# Patient Record
Sex: MALE | Race: WHITE | NOT HISPANIC OR LATINO | Employment: OTHER | URBAN - NONMETROPOLITAN AREA
[De-identification: names, ages, dates, MRNs, and addresses within clinical notes are randomized per-mention and may not be internally consistent; named-entity substitution may affect disease eponyms.]

---

## 2023-05-16 ENCOUNTER — OFFICE VISIT (OUTPATIENT)
Dept: PRIMARY CARE | Facility: CLINIC | Age: 78
End: 2023-05-16
Payer: MEDICARE

## 2023-05-16 VITALS
SYSTOLIC BLOOD PRESSURE: 148 MMHG | DIASTOLIC BLOOD PRESSURE: 82 MMHG | BODY MASS INDEX: 25.55 KG/M2 | HEART RATE: 62 BPM | TEMPERATURE: 96.9 F | WEIGHT: 192.8 LBS | OXYGEN SATURATION: 97 % | HEIGHT: 73 IN

## 2023-05-16 DIAGNOSIS — Z13.1 SCREENING FOR DIABETES MELLITUS: ICD-10-CM

## 2023-05-16 DIAGNOSIS — R73.09 ELEVATED GLUCOSE: ICD-10-CM

## 2023-05-16 DIAGNOSIS — Z00.00 ROUTINE GENERAL MEDICAL EXAMINATION AT HEALTH CARE FACILITY: Primary | ICD-10-CM

## 2023-05-16 DIAGNOSIS — Z13.89 SCREENING FOR MULTIPLE CONDITIONS: ICD-10-CM

## 2023-05-16 DIAGNOSIS — I10 PRIMARY HYPERTENSION: ICD-10-CM

## 2023-05-16 DIAGNOSIS — E55.9 VITAMIN D DEFICIENCY: ICD-10-CM

## 2023-05-16 DIAGNOSIS — R53.83 OTHER FATIGUE: ICD-10-CM

## 2023-05-16 PROBLEM — Z86.0101 HISTORY OF ADENOMATOUS POLYP OF COLON: Status: ACTIVE | Noted: 2023-05-16

## 2023-05-16 PROBLEM — M25.612 STIFFNESS OF LEFT SHOULDER JOINT: Status: ACTIVE | Noted: 2023-05-16

## 2023-05-16 PROBLEM — E87.6 HYPOKALEMIA: Status: ACTIVE | Noted: 2023-05-16

## 2023-05-16 PROBLEM — M25.512 LEFT SHOULDER PAIN: Status: ACTIVE | Noted: 2023-05-16

## 2023-05-16 PROBLEM — Z86.010 HISTORY OF ADENOMATOUS POLYP OF COLON: Status: ACTIVE | Noted: 2023-05-16

## 2023-05-16 PROBLEM — R79.89 LOW VITAMIN D LEVEL: Status: ACTIVE | Noted: 2023-05-16

## 2023-05-16 PROBLEM — K21.9 GERD (GASTROESOPHAGEAL REFLUX DISEASE): Status: ACTIVE | Noted: 2023-05-16

## 2023-05-16 PROBLEM — K58.2 IRRITABLE BOWEL SYNDROME WITH BOTH CONSTIPATION AND DIARRHEA: Status: ACTIVE | Noted: 2023-05-16

## 2023-05-16 PROBLEM — K40.90 INGUINAL HERNIA: Status: ACTIVE | Noted: 2023-05-16

## 2023-05-16 PROBLEM — F40.02 AGORAPHOBIA WITHOUT PANIC: Status: ACTIVE | Noted: 2023-05-16

## 2023-05-16 PROBLEM — H25.813 COMBINED FORM OF AGE-RELATED CATARACT, BOTH EYES: Status: ACTIVE | Noted: 2023-05-16

## 2023-05-16 LAB
ALANINE AMINOTRANSFERASE (SGPT) (U/L) IN SER/PLAS: 24 U/L (ref 10–52)
ALBUMIN (G/DL) IN SER/PLAS: 4.3 G/DL (ref 3.4–5)
ALKALINE PHOSPHATASE (U/L) IN SER/PLAS: 78 U/L (ref 33–136)
ANION GAP IN SER/PLAS: 14 MMOL/L (ref 10–20)
ASPARTATE AMINOTRANSFERASE (SGOT) (U/L) IN SER/PLAS: 22 U/L (ref 9–39)
BILIRUBIN TOTAL (MG/DL) IN SER/PLAS: 0.7 MG/DL (ref 0–1.2)
CALCIUM (MG/DL) IN SER/PLAS: 9.5 MG/DL (ref 8.6–10.3)
CARBON DIOXIDE, TOTAL (MMOL/L) IN SER/PLAS: 27 MMOL/L (ref 21–32)
CHLORIDE (MMOL/L) IN SER/PLAS: 104 MMOL/L (ref 98–107)
CHOLESTEROL (MG/DL) IN SER/PLAS: 172 MG/DL (ref 0–199)
CHOLESTEROL IN HDL (MG/DL) IN SER/PLAS: 43 MG/DL
CHOLESTEROL/HDL RATIO: 4
CREATININE (MG/DL) IN SER/PLAS: 0.99 MG/DL (ref 0.5–1.3)
GFR MALE: 77 ML/MIN/1.73M2
GLUCOSE (MG/DL) IN SER/PLAS: 120 MG/DL (ref 74–99)
LDL: 94 MG/DL (ref 0–99)
POTASSIUM (MMOL/L) IN SER/PLAS: 4 MMOL/L (ref 3.5–5.3)
PROTEIN TOTAL: 7.4 G/DL (ref 6.4–8.2)
SODIUM (MMOL/L) IN SER/PLAS: 141 MMOL/L (ref 136–145)
TRIGLYCERIDE (MG/DL) IN SER/PLAS: 177 MG/DL (ref 0–149)
UREA NITROGEN (MG/DL) IN SER/PLAS: 15 MG/DL (ref 6–23)
VLDL: 35 MG/DL (ref 0–40)

## 2023-05-16 PROCEDURE — G0439 PPPS, SUBSEQ VISIT: HCPCS

## 2023-05-16 PROCEDURE — 3077F SYST BP >= 140 MM HG: CPT

## 2023-05-16 PROCEDURE — 1170F FXNL STATUS ASSESSED: CPT

## 2023-05-16 PROCEDURE — 80053 COMPREHEN METABOLIC PANEL: CPT

## 2023-05-16 PROCEDURE — 3079F DIAST BP 80-89 MM HG: CPT

## 2023-05-16 PROCEDURE — 83036 HEMOGLOBIN GLYCOSYLATED A1C: CPT

## 2023-05-16 PROCEDURE — 84443 ASSAY THYROID STIM HORMONE: CPT

## 2023-05-16 PROCEDURE — 1036F TOBACCO NON-USER: CPT

## 2023-05-16 PROCEDURE — 36415 COLL VENOUS BLD VENIPUNCTURE: CPT

## 2023-05-16 PROCEDURE — 1159F MED LIST DOCD IN RCRD: CPT

## 2023-05-16 PROCEDURE — 80061 LIPID PANEL: CPT

## 2023-05-16 PROCEDURE — 1160F RVW MEDS BY RX/DR IN RCRD: CPT

## 2023-05-16 RX ORDER — AMLODIPINE BESYLATE 10 MG/1
10 TABLET ORAL DAILY
Qty: 90 TABLET | Refills: 1 | Status: SHIPPED | OUTPATIENT
Start: 2023-05-16 | End: 2023-11-15 | Stop reason: SDUPTHER

## 2023-05-16 RX ORDER — VIT C/E/ZN/COPPR/LUTEIN/ZEAXAN 250MG-90MG
25 CAPSULE ORAL DAILY
Qty: 90 CAPSULE | Refills: 2 | Status: SHIPPED | OUTPATIENT
Start: 2023-05-16 | End: 2024-05-15

## 2023-05-16 RX ORDER — AMLODIPINE BESYLATE 10 MG/1
10 TABLET ORAL DAILY
COMMUNITY
End: 2023-05-16 | Stop reason: SDUPTHER

## 2023-05-16 ASSESSMENT — PATIENT HEALTH QUESTIONNAIRE - PHQ9
1. LITTLE INTEREST OR PLEASURE IN DOING THINGS: NOT AT ALL
2. FEELING DOWN, DEPRESSED OR HOPELESS: NOT AT ALL
SUM OF ALL RESPONSES TO PHQ9 QUESTIONS 1 AND 2: 0

## 2023-05-16 ASSESSMENT — ENCOUNTER SYMPTOMS
LOSS OF SENSATION IN FEET: 0
HEADACHES: 0
RESPIRATORY NEGATIVE: 1
SHORTNESS OF BREATH: 0
OCCASIONAL FEELINGS OF UNSTEADINESS: 0
ALLERGIC/IMMUNOLOGIC NEGATIVE: 1
ENDOCRINE NEGATIVE: 1
UNEXPECTED WEIGHT CHANGE: 0
DIZZINESS: 0
ABDOMINAL PAIN: 0
FEVER: 0
PSYCHIATRIC NEGATIVE: 1
MUSCULOSKELETAL NEGATIVE: 1
CARDIOVASCULAR NEGATIVE: 1
DEPRESSION: 0
ACTIVITY CHANGE: 0
GASTROINTESTINAL NEGATIVE: 1
WEAKNESS: 0
FATIGUE: 0

## 2023-05-16 ASSESSMENT — ACTIVITIES OF DAILY LIVING (ADL)
DOING_HOUSEWORK: INDEPENDENT
DRESSING: INDEPENDENT
TAKING_MEDICATION: INDEPENDENT
MANAGING_FINANCES: INDEPENDENT
GROCERY_SHOPPING: INDEPENDENT
BATHING: INDEPENDENT

## 2023-05-16 NOTE — PROGRESS NOTES
Subjective   Reason for Visit: Arie Agustin is an 78 y.o. male here for a Medicare Wellness visit.     Past Medical, Surgical, and Family History reviewed and updated in chart.    Reviewed all medications by prescribing practitioner or clinical pharmacist (such as prescriptions, OTCs, herbal therapies and supplements) and documented in the medical record.    Fatigued more oftenSubjective  Ruperto Agustin is a 78 y.o. male who presents for medicare wellness, and evaluation of fatigue. Symptoms began several years ago. The patient feels the fatigue began with: constipation. Symptoms of his fatigue have been hypersomnolence and lack of interest in usual activities. Patient describes the following psychological symptoms: none. Patient denies change in hair texture, cold intolerance, fever, symptoms of arthritis, and unusual rashes. Symptoms have waxed and waned, worse overall. Symptom severity: symptoms bothersome, trouble unable to carry out all usual work/school/family activities. Previous visits for this problem: yes, last seen 2 years ago by previous pcp.     Objective  [unfilled]    Assessment/Plan  Fatigue, organic cause likely.   Discussed diagnosis with patient.  Discussed lifestyle modification as means of resolving problem.  See orders for lab evaluation.  Discussed how depression can be a cause of fatigue.    Lab eval includes repeat TSH and A1c, order placed for increasing his Vitamin D, goal to increase to above 50.     Diarrhea and constipation, gut nany deficit since his teenage years.         Patient Care Team:  DIANE Baldwin-CNP as PCP - General (Family Medicine)     Review of Systems   Constitutional:  Negative for activity change, fatigue, fever and unexpected weight change.   HENT: Negative.     Respiratory: Negative.  Negative for shortness of breath.    Cardiovascular: Negative.  Negative for chest pain.   Gastrointestinal: Negative.  Negative for abdominal pain.   Endocrine: Negative.   "  Musculoskeletal: Negative.    Skin: Negative.    Allergic/Immunologic: Negative.    Neurological:  Negative for dizziness, weakness and headaches.   Psychiatric/Behavioral: Negative.     All other systems reviewed and are negative.      Objective   Vitals:  /82   Pulse 62   Temp 36.1 °C (96.9 °F)   Ht 1.854 m (6' 1\")   Wt 87.5 kg (192 lb 12.8 oz)   SpO2 97%   BMI 25.44 kg/m²       Physical Exam  Vitals and nursing note reviewed.   Constitutional:       Appearance: Normal appearance.   HENT:      Head: Normocephalic.      Mouth/Throat:      Mouth: Mucous membranes are moist.   Cardiovascular:      Rate and Rhythm: Normal rate and regular rhythm.      Pulses: Normal pulses.      Heart sounds: Normal heart sounds. No murmur heard.     No friction rub. No gallop.   Pulmonary:      Effort: Pulmonary effort is normal. No respiratory distress.      Breath sounds: Normal breath sounds. No wheezing.   Abdominal:      General: Bowel sounds are normal. There is no distension.      Palpations: Abdomen is soft.      Tenderness: There is no abdominal tenderness.   Musculoskeletal:         General: No deformity. Normal range of motion.   Skin:     General: Skin is warm and dry.      Capillary Refill: Capillary refill takes less than 2 seconds.   Neurological:      General: No focal deficit present.      Mental Status: He is alert and oriented to person, place, and time.   Psychiatric:         Mood and Affect: Mood normal.         Assessment/Plan   Problem List Items Addressed This Visit       Elevated glucose    Relevant Orders    Hemoglobin A1c    Hypertension    Relevant Medications    amLODIPine (Norvasc) 10 mg tablet    Other Relevant Orders    Lipid panel    Comprehensive metabolic panel    Vitamin D deficiency    Relevant Medications    cholecalciferol (Vitamin D3) 25 MCG (1000 UT) capsule     Other Visit Diagnoses       Routine general medical examination at health care facility    -  Primary    Relevant " Orders    1 Year Follow Up In Primary Care - Wellness Exam    Screening for multiple conditions        Screening for diabetes mellitus        Other fatigue        Relevant Orders    TSH with reflex to Free T4 if abnormal

## 2023-05-16 NOTE — PATIENT INSTRUCTIONS
Increase Vitamin D 400IU 2 tablets daily  Amlodipine refilled    See in 3 months follow up on fatigue  Thank you for coming in today, if any questions or concerns arise, please call my office.   DIANE Baldwin-CNP

## 2023-05-17 LAB
ESTIMATED AVERAGE GLUCOSE FOR HBA1C: 128 MG/DL
HEMOGLOBIN A1C/HEMOGLOBIN TOTAL IN BLOOD: 6.1 %
THYROTROPIN (MIU/L) IN SER/PLAS BY DETECTION LIMIT <= 0.05 MIU/L: 2.66 MIU/L (ref 0.44–3.98)

## 2023-05-18 ENCOUNTER — TELEPHONE (OUTPATIENT)
Dept: PRIMARY CARE | Facility: CLINIC | Age: 78
End: 2023-05-18
Payer: MEDICARE

## 2023-05-18 NOTE — TELEPHONE ENCOUNTER
Spoke with Ruperto. He verbalized understanding of lab results. He will watch his sugar intake. No further concerns.

## 2023-05-18 NOTE — TELEPHONE ENCOUNTER
----- Message from DIANE Baldwin-CNP sent at 5/17/2023  8:09 AM EDT -----  A1c is up from 2 years ago, 6.1 puts him in prediabetic range.

## 2023-11-08 PROBLEM — M25.611 STIFFNESS OF RIGHT SHOULDER JOINT: Status: ACTIVE | Noted: 2023-11-08

## 2023-11-08 RX ORDER — NAPROXEN 500 MG/1
1 TABLET ORAL EVERY 12 HOURS PRN
COMMUNITY
Start: 2021-06-01

## 2023-11-08 RX ORDER — LANSOPRAZOLE 30 MG/1
CAPSULE, DELAYED RELEASE ORAL
COMMUNITY

## 2023-11-09 ENCOUNTER — OFFICE VISIT (OUTPATIENT)
Dept: OPHTHALMOLOGY | Facility: CLINIC | Age: 78
End: 2023-11-09
Payer: MEDICARE

## 2023-11-09 DIAGNOSIS — H25.813 COMBINED FORMS OF AGE-RELATED CATARACT, BILATERAL: Primary | ICD-10-CM

## 2023-11-09 PROCEDURE — 92014 COMPRE OPH EXAM EST PT 1/>: CPT | Performed by: OPHTHALMOLOGY

## 2023-11-09 ASSESSMENT — REFRACTION_WEARINGRX
OD_SPHERE: -0.25
OS_SPHERE: PLANO
OS_AXIS: 045
OS_ADD: +2.50
OS_CYLINDER: -0.25
OD_AXIS: 045
OD_ADD: +2.50
OD_CYLINDER: -0.50

## 2023-11-09 ASSESSMENT — VISUAL ACUITY
OS_SC: 20/25+1
OD_SC: 20/40
OD_BAT_MED: 20/30
OS_BAT_MED: 20/30
METHOD: SNELLEN - LINEAR
OD_PH_SC: 20/25+2

## 2023-11-09 ASSESSMENT — EXTERNAL EXAM - RIGHT EYE: OD_EXAM: NORMAL

## 2023-11-09 ASSESSMENT — REFRACTION_MANIFEST
OD_AXIS: 045
OS_AXIS: 045
OD_SPHERE: -0.75
OD_ADD: +2.50
OD_CYLINDER: -0.50
OS_ADD: +2.50
OS_SPHERE: PLANO
OS_CYLINDER: -0.50

## 2023-11-09 ASSESSMENT — SLIT LAMP EXAM - LIDS
COMMENTS: NORMAL
COMMENTS: NORMAL

## 2023-11-09 ASSESSMENT — CUP TO DISC RATIO
OD_RATIO: 0.3
OS_RATIO: 0.3

## 2023-11-09 ASSESSMENT — TONOMETRY
OS_IOP_MMHG: 14
IOP_METHOD: GOLDMANN APPLANATION
OD_IOP_MMHG: 12

## 2023-11-09 ASSESSMENT — EXTERNAL EXAM - LEFT EYE: OS_EXAM: NORMAL

## 2023-11-09 NOTE — PROGRESS NOTES
Assessment/Plan   Diagnoses and all orders for this visit:  Combined forms of age-related cataract, bilateral  Cataract not visually significant at this time. Discussed cataract surgery indications,20/50 or worse, glare dropping vision 2 lines or more and affecting activities of daily living  Observe for progression   -Followup in 1 year or as needed for symptoms (RSVP: redness, sensitivity to light, vision loss, pain)

## 2023-11-15 DIAGNOSIS — I10 PRIMARY HYPERTENSION: ICD-10-CM

## 2023-11-16 RX ORDER — AMLODIPINE BESYLATE 10 MG/1
10 TABLET ORAL DAILY
Qty: 90 TABLET | Refills: 1 | Status: SHIPPED | OUTPATIENT
Start: 2023-11-16 | End: 2024-02-22 | Stop reason: SDUPTHER

## 2024-02-06 ENCOUNTER — OFFICE VISIT (OUTPATIENT)
Dept: PRIMARY CARE | Facility: CLINIC | Age: 79
End: 2024-02-06
Payer: MEDICARE

## 2024-02-06 VITALS
SYSTOLIC BLOOD PRESSURE: 130 MMHG | OXYGEN SATURATION: 97 % | HEART RATE: 74 BPM | TEMPERATURE: 98.9 F | WEIGHT: 186 LBS | DIASTOLIC BLOOD PRESSURE: 80 MMHG | BODY MASS INDEX: 24.54 KG/M2

## 2024-02-06 DIAGNOSIS — S63.602A SPRAIN OF LEFT THUMB, UNSPECIFIED SITE OF DIGIT, INITIAL ENCOUNTER: Primary | ICD-10-CM

## 2024-02-06 PROCEDURE — 3079F DIAST BP 80-89 MM HG: CPT

## 2024-02-06 PROCEDURE — 1036F TOBACCO NON-USER: CPT

## 2024-02-06 PROCEDURE — 1159F MED LIST DOCD IN RCRD: CPT

## 2024-02-06 PROCEDURE — 3075F SYST BP GE 130 - 139MM HG: CPT

## 2024-02-06 PROCEDURE — 99213 OFFICE O/P EST LOW 20 MIN: CPT

## 2024-02-06 RX ORDER — DICLOFENAC SODIUM 10 MG/G
4 GEL TOPICAL 4 TIMES DAILY PRN
Qty: 100 G | Refills: 0 | Status: SHIPPED | OUTPATIENT
Start: 2024-02-06 | End: 2024-02-06 | Stop reason: SDUPTHER

## 2024-02-06 RX ORDER — DICLOFENAC SODIUM 10 MG/G
4 GEL TOPICAL 4 TIMES DAILY PRN
Qty: 100 G | Refills: 0 | Status: SHIPPED | OUTPATIENT
Start: 2024-02-06

## 2024-02-06 ASSESSMENT — ENCOUNTER SYMPTOMS
JOINT SWELLING: 1
ARTHRALGIAS: 1

## 2024-02-06 NOTE — PROGRESS NOTES
Subjective   Patient ID: Arie Agustin is a 78 y.o. male who presents for thumb pain (Arie is here for left thump pain. Has been having issues tying his shoes, and hurts worse at night. Regards if he exercises or leaves if it alone it still hurts. ).  Patient presents for acute thumb pain  --no trauma to the hand  --this has been present for the past few months.        Vitals:    02/06/24 0931   BP: 130/80   Pulse: 74   Temp: 37.2 °C (98.9 °F)   SpO2: 97%       Review of Systems   Musculoskeletal:  Positive for arthralgias and joint swelling.       Objective   Physical Exam  Vitals and nursing note reviewed.   Constitutional:       Appearance: Normal appearance. He is not ill-appearing.   Musculoskeletal:      Right hand: No swelling or deformity.      Left hand: No swelling or deformity.        Hands:       Comments: Left thumb base pain with fine motor movements including tying shoes.   Neurological:      Mental Status: He is alert.         Assessment/Plan   Problem List Items Addressed This Visit    None  Visit Diagnoses       Sprain of left thumb, unspecified site of digit, initial encounter    -  Primary    Voltaren Gel, Brace at night for the next 4 weeks.    Relevant Medications    diclofenac sodium (Voltaren Arthritis Pain) 1 % gel                 Thank you for coming in today, please call my office if you have any concerns or questions.     Marcelino CONTRERAS, CNP

## 2024-02-22 DIAGNOSIS — I10 PRIMARY HYPERTENSION: ICD-10-CM

## 2024-02-22 RX ORDER — AMLODIPINE BESYLATE 10 MG/1
10 TABLET ORAL DAILY
Qty: 90 TABLET | Refills: 1 | Status: SHIPPED | OUTPATIENT
Start: 2024-02-22 | End: 2024-05-21 | Stop reason: SDUPTHER

## 2024-03-25 ENCOUNTER — TELEPHONE (OUTPATIENT)
Dept: PRIMARY CARE | Facility: CLINIC | Age: 79
End: 2024-03-25
Payer: MEDICARE

## 2024-03-26 ENCOUNTER — HOSPITAL ENCOUNTER (OUTPATIENT)
Dept: RADIOLOGY | Facility: CLINIC | Age: 79
Discharge: HOME | End: 2024-03-26
Payer: MEDICARE

## 2024-03-26 DIAGNOSIS — S63.602A SPRAIN OF LEFT THUMB, UNSPECIFIED SITE OF DIGIT, INITIAL ENCOUNTER: ICD-10-CM

## 2024-03-26 DIAGNOSIS — S63.602A SPRAIN OF LEFT THUMB, UNSPECIFIED SITE OF DIGIT, INITIAL ENCOUNTER: Primary | ICD-10-CM

## 2024-03-26 PROCEDURE — 73130 X-RAY EXAM OF HAND: CPT | Mod: LEFT SIDE | Performed by: RADIOLOGY

## 2024-03-26 PROCEDURE — 73130 X-RAY EXAM OF HAND: CPT | Mod: LT

## 2024-03-26 NOTE — TELEPHONE ENCOUNTER
Spoke with Arie, he is aware of xrays that are ordered.  He stated that he will go to [facility]

## 2024-03-29 ENCOUNTER — TELEPHONE (OUTPATIENT)
Dept: PRIMARY CARE | Facility: CLINIC | Age: 79
End: 2024-03-29
Payer: MEDICARE

## 2024-03-29 NOTE — RESULT ENCOUNTER NOTE
Results were abnormal... advanced osteoarthritis of the left hand, no signs of fracture. NSAIDs for as needed relief, Voltaren gel as topical treatment, also can splint at night with ACE brace.

## 2024-03-29 NOTE — TELEPHONE ENCOUNTER
----- Message from DIANE Baldwin-CNP sent at 3/29/2024  8:11 AM EDT -----  Results were abnormal... advanced osteoarthritis of the left hand, no signs of fracture. NSAIDs for as needed relief, Voltaren gel as topical treatment, also can splint at night with ACE brace.

## 2024-03-29 NOTE — TELEPHONE ENCOUNTER
Spoke with Arie at 2:49pm. Verbalized understanding and will contact us if things dont start to improve.

## 2024-05-21 DIAGNOSIS — I10 PRIMARY HYPERTENSION: ICD-10-CM

## 2024-05-21 RX ORDER — AMLODIPINE BESYLATE 10 MG/1
10 TABLET ORAL DAILY
Qty: 90 TABLET | Refills: 1 | Status: SHIPPED | OUTPATIENT
Start: 2024-05-21 | End: 2024-11-17

## 2024-08-06 ENCOUNTER — APPOINTMENT (OUTPATIENT)
Dept: PRIMARY CARE | Facility: CLINIC | Age: 79
End: 2024-08-06
Payer: MEDICARE

## 2024-08-06 ENCOUNTER — LAB (OUTPATIENT)
Dept: LAB | Facility: LAB | Age: 79
End: 2024-08-06
Payer: MEDICARE

## 2024-08-06 VITALS
BODY MASS INDEX: 25.06 KG/M2 | HEIGHT: 72 IN | HEART RATE: 82 BPM | DIASTOLIC BLOOD PRESSURE: 60 MMHG | OXYGEN SATURATION: 97 % | WEIGHT: 185 LBS | SYSTOLIC BLOOD PRESSURE: 130 MMHG | TEMPERATURE: 96.2 F

## 2024-08-06 DIAGNOSIS — E55.9 VITAMIN D DEFICIENCY, UNSPECIFIED: ICD-10-CM

## 2024-08-06 DIAGNOSIS — Z13.1 DIABETES MELLITUS SCREENING: ICD-10-CM

## 2024-08-06 DIAGNOSIS — Z13.6 SCREENING FOR CARDIOVASCULAR CONDITION: ICD-10-CM

## 2024-08-06 DIAGNOSIS — L30.9 DERMATITIS: ICD-10-CM

## 2024-08-06 DIAGNOSIS — K58.2 IRRITABLE BOWEL SYNDROME WITH BOTH CONSTIPATION AND DIARRHEA: ICD-10-CM

## 2024-08-06 DIAGNOSIS — Z13.89 SCREENING FOR MULTIPLE CONDITIONS: ICD-10-CM

## 2024-08-06 DIAGNOSIS — Z00.00 ROUTINE GENERAL MEDICAL EXAMINATION AT HEALTH CARE FACILITY: Primary | ICD-10-CM

## 2024-08-06 DIAGNOSIS — R53.83 OTHER FATIGUE: ICD-10-CM

## 2024-08-06 DIAGNOSIS — I10 PRIMARY HYPERTENSION: ICD-10-CM

## 2024-08-06 LAB
ALBUMIN SERPL BCP-MCNC: 4.3 G/DL (ref 3.4–5)
ALP SERPL-CCNC: 74 U/L (ref 33–136)
ALT SERPL W P-5'-P-CCNC: 22 U/L (ref 10–52)
ANION GAP SERPL CALC-SCNC: 14 MMOL/L (ref 10–20)
AST SERPL W P-5'-P-CCNC: 19 U/L (ref 9–39)
BILIRUB SERPL-MCNC: 0.6 MG/DL (ref 0–1.2)
BUN SERPL-MCNC: 15 MG/DL (ref 6–23)
CALCIUM SERPL-MCNC: 8.9 MG/DL (ref 8.6–10.3)
CHLORIDE SERPL-SCNC: 108 MMOL/L (ref 98–107)
CHOLEST SERPL-MCNC: 143 MG/DL (ref 0–199)
CHOLESTEROL/HDL RATIO: 4
CO2 SERPL-SCNC: 23 MMOL/L (ref 21–32)
CREAT SERPL-MCNC: 1.01 MG/DL (ref 0.5–1.3)
EGFRCR SERPLBLD CKD-EPI 2021: 75 ML/MIN/1.73M*2
GLUCOSE SERPL-MCNC: 117 MG/DL (ref 74–99)
HDLC SERPL-MCNC: 35.7 MG/DL
LDLC SERPL CALC-MCNC: 74 MG/DL
NON HDL CHOLESTEROL: 107 MG/DL (ref 0–149)
POTASSIUM SERPL-SCNC: 3.8 MMOL/L (ref 3.5–5.3)
PROT SERPL-MCNC: 6.9 G/DL (ref 6.4–8.2)
SODIUM SERPL-SCNC: 141 MMOL/L (ref 136–145)
TRIGL SERPL-MCNC: 169 MG/DL (ref 0–149)
VLDL: 34 MG/DL (ref 0–40)

## 2024-08-06 PROCEDURE — 3075F SYST BP GE 130 - 139MM HG: CPT

## 2024-08-06 PROCEDURE — 1160F RVW MEDS BY RX/DR IN RCRD: CPT

## 2024-08-06 PROCEDURE — 1159F MED LIST DOCD IN RCRD: CPT

## 2024-08-06 PROCEDURE — 1123F ACP DISCUSS/DSCN MKR DOCD: CPT

## 2024-08-06 PROCEDURE — 1170F FXNL STATUS ASSESSED: CPT

## 2024-08-06 PROCEDURE — 99213 OFFICE O/P EST LOW 20 MIN: CPT

## 2024-08-06 PROCEDURE — 80061 LIPID PANEL: CPT

## 2024-08-06 PROCEDURE — 3078F DIAST BP <80 MM HG: CPT

## 2024-08-06 PROCEDURE — G0439 PPPS, SUBSEQ VISIT: HCPCS

## 2024-08-06 PROCEDURE — 82607 VITAMIN B-12: CPT

## 2024-08-06 PROCEDURE — 1158F ADVNC CARE PLAN TLK DOCD: CPT

## 2024-08-06 PROCEDURE — 1036F TOBACCO NON-USER: CPT

## 2024-08-06 PROCEDURE — 80053 COMPREHEN METABOLIC PANEL: CPT

## 2024-08-06 PROCEDURE — 82306 VITAMIN D 25 HYDROXY: CPT

## 2024-08-06 PROCEDURE — 36415 COLL VENOUS BLD VENIPUNCTURE: CPT

## 2024-08-06 RX ORDER — TRIAMCINOLONE ACETONIDE 5 MG/G
CREAM TOPICAL 3 TIMES DAILY
Qty: 15 G | Refills: 0 | Status: SHIPPED | OUTPATIENT
Start: 2024-08-06

## 2024-08-06 RX ORDER — OLMESARTAN MEDOXOMIL 40 MG/1
40 TABLET ORAL DAILY
Qty: 30 TABLET | Refills: 0 | Status: SHIPPED | OUTPATIENT
Start: 2024-08-06 | End: 2024-09-05

## 2024-08-06 RX ORDER — ESOMEPRAZOLE MAGNESIUM 40 MG/1
40 CAPSULE, DELAYED RELEASE ORAL
Qty: 90 CAPSULE | Refills: 0 | Status: SHIPPED | OUTPATIENT
Start: 2024-08-06 | End: 2024-11-04

## 2024-08-06 ASSESSMENT — ENCOUNTER SYMPTOMS
ABDOMINAL PAIN: 0
FEVER: 0
FATIGUE: 0
MUSCULOSKELETAL NEGATIVE: 1
HEADACHES: 0
CARDIOVASCULAR NEGATIVE: 1
UNEXPECTED WEIGHT CHANGE: 0
LOSS OF SENSATION IN FEET: 0
DIARRHEA: 1
DEPRESSION: 0
ENDOCRINE NEGATIVE: 1
WEAKNESS: 0
DIZZINESS: 0
APPETITE CHANGE: 0
ALLERGIC/IMMUNOLOGIC NEGATIVE: 1
RESPIRATORY NEGATIVE: 1
NAUSEA: 1
OCCASIONAL FEELINGS OF UNSTEADINESS: 0
CONSTIPATION: 1
SHORTNESS OF BREATH: 0
ACTIVITY CHANGE: 0
PSYCHIATRIC NEGATIVE: 1

## 2024-08-06 ASSESSMENT — ACTIVITIES OF DAILY LIVING (ADL)
DRESSING: INDEPENDENT
DOING_HOUSEWORK: INDEPENDENT
TAKING_MEDICATION: INDEPENDENT
MANAGING_FINANCES: INDEPENDENT
BATHING: INDEPENDENT
GROCERY_SHOPPING: INDEPENDENT

## 2024-08-06 ASSESSMENT — PATIENT HEALTH QUESTIONNAIRE - PHQ9
1. LITTLE INTEREST OR PLEASURE IN DOING THINGS: NOT AT ALL
SUM OF ALL RESPONSES TO PHQ9 QUESTIONS 1 AND 2: 0
2. FEELING DOWN, DEPRESSED OR HOPELESS: NOT AT ALL

## 2024-08-06 NOTE — PATIENT INSTRUCTIONS
Check BP daily, try Benicar for 30 days, STOP amlodipine  See me in 4 weeks BP check    Stop Prevacid, start Nexium    Thank you for coming in today, if any questions or concerns arise, please call my office.   DIANE Baldwin-CNP

## 2024-08-06 NOTE — PROGRESS NOTES
Subjective   Reason for Visit: Arie Augstin is an 79 y.o. male here for a Medicare Wellness visit.     Past Medical, Surgical, and Family History reviewed and updated in chart.    Reviewed all medications by prescribing practitioner or clinical pharmacist (such as prescriptions, OTCs, herbal therapies and supplements) and documented in the medical record.    [facility]    Patient Care Team:  DIANE Baldwin-CNP as PCP - General (Family Medicine)     Review of Systems   Constitutional:  Negative for activity change, appetite change, fatigue, fever and unexpected weight change.   HENT: Negative.  Negative for congestion and dental problem.    Respiratory: Negative.  Negative for shortness of breath.    Cardiovascular: Negative.  Negative for chest pain.   Gastrointestinal:  Positive for constipation, diarrhea and nausea. Negative for abdominal pain.   Endocrine: Negative.    Musculoskeletal: Negative.    Skin: Negative.    Allergic/Immunologic: Negative.    Neurological:  Negative for dizziness, weakness and headaches.   Psychiatric/Behavioral: Negative.         Objective   Vitals:  /60   Pulse 82   Temp 35.7 °C (96.2 °F) (Temporal)   Ht 1.829 m (6')   Wt 83.9 kg (185 lb)   SpO2 97%   BMI 25.09 kg/m²       Physical Exam  Vitals and nursing note reviewed.   Constitutional:       Appearance: Normal appearance.   HENT:      Head: Normocephalic.      Mouth/Throat:      Mouth: Mucous membranes are moist.   Cardiovascular:      Rate and Rhythm: Normal rate and regular rhythm.      Pulses: Normal pulses.      Heart sounds: Normal heart sounds. No murmur heard.     No friction rub. No gallop.   Pulmonary:      Effort: Pulmonary effort is normal. No respiratory distress.      Breath sounds: Normal breath sounds. No wheezing.   Abdominal:      General: Bowel sounds are normal. There is no distension.      Palpations: Abdomen is soft.      Tenderness: There is no abdominal tenderness.   Musculoskeletal:          General: No deformity. Normal range of motion.   Skin:     General: Skin is warm and dry.      Capillary Refill: Capillary refill takes less than 2 seconds.   Neurological:      General: No focal deficit present.      Mental Status: He is alert and oriented to person, place, and time.   Psychiatric:         Mood and Affect: Mood normal.         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    Relevant Medications    esomeprazole (NexIUM) 40 mg DR capsule     Other Visit Diagnoses         Codes    Routine general medical examination at health care facility    -  Primary Z00.00    Dermatitis     L30.9    Relevant Medications    triamcinolone (Kenalog) 0.5 % cream    Other Relevant Orders    Referral to Dermatology    Screening for multiple conditions     Z13.89    Diabetes mellitus screening     Z13.1    Relevant Orders    Comprehensive Metabolic Panel    Screening for cardiovascular condition     Z13.6    Relevant Orders    Lipid panel    Other fatigue     R53.83    Relevant Orders    Vitamin D 25-Hydroxy,Total (for eval of Vitamin D levels)    Vitamin B12    Vitamin D deficiency, unspecified     E55.9    Relevant Orders    Vitamin D 25-Hydroxy,Total (for eval of Vitamin D levels)              Advance Directives Discussion  16 - 20 minutes were spent discussing Advanced Care Planning (including a Living Will, Medical Power Of , as well as specific end of life choices and/or directives). The details of that discussion were documented in Advanced Directives Discussion section of the medical record.

## 2024-08-07 DIAGNOSIS — R73.09 ELEVATED GLUCOSE: Primary | ICD-10-CM

## 2024-08-07 LAB
25(OH)D3 SERPL-MCNC: 44 NG/ML (ref 30–100)
VIT B12 SERPL-MCNC: 310 PG/ML (ref 211–911)

## 2024-08-07 NOTE — RESULT ENCOUNTER NOTE
Results were abnormal... his B12 is on the low side, I would add this to his regimen this is otc I am adding A1c please make sure lab can add this.

## 2024-08-13 ENCOUNTER — TELEPHONE (OUTPATIENT)
Dept: PRIMARY CARE | Facility: CLINIC | Age: 79
End: 2024-08-13
Payer: MEDICARE

## 2024-08-13 NOTE — TELEPHONE ENCOUNTER
----- Message from Marcelino Daniel sent at 8/7/2024 12:37 PM EDT -----  Results were abnormal... his B12 is on the low side, I would add this to his regimen this is otc I am adding A1c please make sure lab can add this.

## 2024-08-28 ENCOUNTER — OFFICE VISIT (OUTPATIENT)
Dept: DERMATOLOGY | Facility: CLINIC | Age: 79
End: 2024-08-28
Payer: MEDICARE

## 2024-08-28 DIAGNOSIS — D22.9 BENIGN NEVUS: ICD-10-CM

## 2024-08-28 DIAGNOSIS — D48.5 NEOPLASM OF UNCERTAIN BEHAVIOR OF SKIN: Primary | ICD-10-CM

## 2024-08-28 DIAGNOSIS — R21 RASH AND OTHER NONSPECIFIC SKIN ERUPTION: ICD-10-CM

## 2024-08-28 PROCEDURE — 1036F TOBACCO NON-USER: CPT | Performed by: NURSE PRACTITIONER

## 2024-08-28 PROCEDURE — 11102 TANGNTL BX SKIN SINGLE LES: CPT | Performed by: NURSE PRACTITIONER

## 2024-08-28 PROCEDURE — 99203 OFFICE O/P NEW LOW 30 MIN: CPT | Performed by: NURSE PRACTITIONER

## 2024-08-28 PROCEDURE — 1160F RVW MEDS BY RX/DR IN RCRD: CPT | Performed by: NURSE PRACTITIONER

## 2024-08-28 PROCEDURE — 1159F MED LIST DOCD IN RCRD: CPT | Performed by: NURSE PRACTITIONER

## 2024-08-28 ASSESSMENT — ITCH NUMERIC RATING SCALE: HOW SEVERE IS YOUR ITCHING?: 10

## 2024-08-28 ASSESSMENT — DERMATOLOGY QUALITY OF LIFE (QOL) ASSESSMENT
RATE HOW BOTHERED YOU ARE BY EFFECTS OF YOUR SKIN PROBLEMS ON YOUR ACTIVITIES (EG, GOING OUT, ACCOMPLISHING WHAT YOU WANT, WORK ACTIVITIES OR YOUR RELATIONSHIPS WITH OTHERS): 5
RATE HOW EMOTIONALLY BOTHERED YOU ARE BY YOUR SKIN PROBLEM (FOR EXAMPLE, WORRY, EMBARRASSMENT, FRUSTRATION): 5
RATE HOW BOTHERED YOU ARE BY SYMPTOMS OF YOUR SKIN PROBLEM (EG, ITCHING, STINGING BURNING, HURTING OR SKIN IRRITATION): 5

## 2024-08-28 NOTE — PROGRESS NOTES
Subjective     Arie Agustin is a 79 y.o. male who presents for the following: Dermatitis (All over).     Review of Systems:  No other skin or systemic complaints other than what is documented elsewhere in the note.    The following portions of the chart were reviewed this encounter and updated as appropriate:   Tobacco  Allergies  Meds  Problems  Med Hx  Surg Hx         Skin Cancer History  No skin cancer on file.      Specialty Problems    None       Objective   Well appearing patient in no apparent distress; mood and affect are within normal limits.    A focused skin examination was performed waist up. All findings within normal limits unless otherwise noted below.    Assessment/Plan   1. Neoplasm of uncertain behavior of skin  Left Lower Leg - Anterior  1 cm red crusty papule              Lesion biopsy  Type of biopsy: tangential    Informed consent: discussed and consent obtained    Timeout: patient name, date of birth, surgical site, and procedure verified    Procedure prep:  Patient was prepped and draped  Anesthesia: the lesion was anesthetized in a standard fashion    Anesthetic:  1% lidocaine plain local infiltration  Instrument used: DermaBlade    Hemostasis achieved with: electrodesiccation    Outcome: patient tolerated procedure well    Post-procedure details: wound care instructions given    Additional details:  Cleaned area with isopropyl alcohol prior to anesthesia or biopsy. Applied thin layer of vaseline and covered with bandaid after procedure      Staff Communication: Dermatology Local Anesthesia: 1 % Plain Lidocaine - Amount:  0.5ml    Specimen 1 - Dermatopathology- DERM LAB  Differential Diagnosis: NMSC  Check Margins Yes/No?:    Comments:    Dermpath Lab: Routine Histopathology (formalin-fixed tissue)    NUB  - Given uncertainty in clinical diagnosis, shave biopsy is recommended in clinic today.  - The patient expressed understanding, is in agreement with this plan, and wishes to proceed  with biopsy.  - Oral and written wound care instructions provided.  - Advised patient that the office will call within 2 weeks to discuss biopsy results.      Related Procedures  Follow Up In Dermatology - Established Patient    2. Benign nevus  Left Shoulder - Anterior  14 x 15 mm tan brown round patch on edge of eagle tattoo.     The present appearance of the lesion is not worrisome but it should continue to be observed and testing/treatment may be warranted if change occurs.    Related Procedures  Follow Up In Dermatology - Established Patient    3. Rash and other nonspecific skin eruption  Red scaly patches to the shins. Pink patches with minimal scale to the back and chest/abdomen area with scattered red scaly papules, some semi lichenified, on the arms and legs. 10-15% BSA    This is a 79 y.o. male here for rash. Started in April 2024. Has tried clobetasol and TAC BID for several weeks. Has also tried OTC anti itch creams. Wife does not have similar rash. Patient reports having rashes come and go on the shins over the years prior to rash starting in April. He stopped amlodipine 2 weeks ago due to causing dizziness. Concomitantly patient reports rash has improved 40% since that time. Itch is still 8/10 on average but reported minimal to no itch while in clinic today. He has been using TCS for past 2 weeks so we are unable to biopsy today. DDx: Drug eruption vs Id reaction previously 2/2 to amlodipine from lower extremity edema/rash(stasis dermatitis like rash described) vs Dermatitis NOS vs less likely CTCL. Given he is doing much better he will follow up in 4 weeks for a recheck. If a rash is still present at that time, we will biopsy. He will stop using topical steroids at this time. Encouraged him to use OTC sarna or gold bond anti itch creams (kept in refrigerator) and using ice packs for breakthrough itch.      Related Procedures  Follow Up In Dermatology - Established Patient        Return in 4 weeks

## 2024-08-30 LAB
LABORATORY COMMENT REPORT: NORMAL
PATH REPORT.FINAL DX SPEC: NORMAL
PATH REPORT.GROSS SPEC: NORMAL
PATH REPORT.MICROSCOPIC SPEC OTHER STN: NORMAL
PATH REPORT.RELEVANT HX SPEC: NORMAL
PATH REPORT.TOTAL CANCER: NORMAL

## 2024-09-03 ENCOUNTER — APPOINTMENT (OUTPATIENT)
Dept: PRIMARY CARE | Facility: CLINIC | Age: 79
End: 2024-09-03
Payer: MEDICARE

## 2024-09-03 VITALS
DIASTOLIC BLOOD PRESSURE: 84 MMHG | BODY MASS INDEX: 24.34 KG/M2 | TEMPERATURE: 97.3 F | SYSTOLIC BLOOD PRESSURE: 146 MMHG | WEIGHT: 179.5 LBS | HEART RATE: 65 BPM | OXYGEN SATURATION: 98 %

## 2024-09-03 DIAGNOSIS — R73.09 ELEVATED GLUCOSE LEVEL: ICD-10-CM

## 2024-09-03 DIAGNOSIS — I10 PRIMARY HYPERTENSION: Primary | ICD-10-CM

## 2024-09-03 DIAGNOSIS — Z13.1 DIABETES MELLITUS SCREENING: ICD-10-CM

## 2024-09-03 LAB — POC HEMOGLOBIN A1C: 6.3 % (ref 4.2–6.5)

## 2024-09-03 PROCEDURE — 3079F DIAST BP 80-89 MM HG: CPT

## 2024-09-03 PROCEDURE — 83036 HEMOGLOBIN GLYCOSYLATED A1C: CPT

## 2024-09-03 PROCEDURE — 99214 OFFICE O/P EST MOD 30 MIN: CPT

## 2024-09-03 PROCEDURE — 3077F SYST BP >= 140 MM HG: CPT

## 2024-09-03 PROCEDURE — 1159F MED LIST DOCD IN RCRD: CPT

## 2024-09-03 PROCEDURE — 1036F TOBACCO NON-USER: CPT

## 2024-09-03 RX ORDER — OLMESARTAN MEDOXOMIL 40 MG/1
40 TABLET ORAL DAILY
Qty: 90 TABLET | Refills: 0 | Status: CANCELLED | OUTPATIENT
Start: 2024-09-03 | End: 2024-12-02

## 2024-09-03 RX ORDER — OLMESARTAN MEDOXOMIL AND HYDROCHLOROTHIAZIDE 40/12.5 40; 12.5 MG/1; MG/1
1 TABLET ORAL DAILY
Qty: 90 TABLET | Refills: 0 | Status: SHIPPED | OUTPATIENT
Start: 2024-09-03 | End: 2024-12-02

## 2024-09-03 NOTE — PATIENT INSTRUCTIONS
Continue olmesartan  See in 3 months monitor BP  Continue checking at home    Thank you for coming in today, if any questions or concerns arise, please call my office.   Marcelino Daniel, DIANE-CNP

## 2024-09-04 ENCOUNTER — TELEPHONE (OUTPATIENT)
Dept: DERMATOLOGY | Facility: CLINIC | Age: 79
End: 2024-09-04
Payer: MEDICARE

## 2024-09-25 ENCOUNTER — APPOINTMENT (OUTPATIENT)
Dept: DERMATOLOGY | Facility: CLINIC | Age: 79
End: 2024-09-25
Payer: MEDICARE

## 2024-09-25 DIAGNOSIS — L82.0 INFLAMED SEBORRHEIC KERATOSIS: Primary | ICD-10-CM

## 2024-09-25 DIAGNOSIS — R21 RASH AND OTHER NONSPECIFIC SKIN ERUPTION: ICD-10-CM

## 2024-09-25 PROCEDURE — 99213 OFFICE O/P EST LOW 20 MIN: CPT | Performed by: NURSE PRACTITIONER

## 2024-09-25 PROCEDURE — 17110 DESTRUCTION B9 LES UP TO 14: CPT | Performed by: NURSE PRACTITIONER

## 2024-09-25 PROCEDURE — 1159F MED LIST DOCD IN RCRD: CPT | Performed by: NURSE PRACTITIONER

## 2024-09-25 PROCEDURE — 1036F TOBACCO NON-USER: CPT | Performed by: NURSE PRACTITIONER

## 2024-09-25 RX ORDER — TRIAMCINOLONE ACETONIDE 1 MG/G
OINTMENT TOPICAL
Qty: 453.6 G | Refills: 0 | Status: SHIPPED | OUTPATIENT
Start: 2024-09-25

## 2024-09-25 NOTE — PROGRESS NOTES
Subjective     Arie Agustin is a 79 y.o. male who presents for the following: Rash.     Mid upper back lesion is very itchy and irritated.     Review of Systems:  No other skin or systemic complaints other than what is documented elsewhere in the note.    The following portions of the chart were reviewed this encounter and updated as appropriate:          Skin Cancer History  Biopsy Date Type Location Status   8/28/24 SCC Left Lower Leg - Anterior Refer Mohs/Surgeon       Specialty Problems    None       Objective   Well appearing patient in no apparent distress; mood and affect are within normal limits.    A focused skin examination was performed waist up and legs. All findings within normal limits unless otherwise noted below.    Assessment/Plan   1. Inflamed seborrheic keratosis  Right Upper Back  Erythematous excoriated crusty verrucal papule(s) and/or plaque(s)    Inflamed Seborrheic Keratosis  - Benign nature of lesion(s) discussed with patient, and reassurance provided.  - Given the lesion (s) are very itchy or irritated, the patient is seeking removal and treatment with liquid nitrogen cryotherapy in clinic today was recommended.  - The patient expressed understanding, is in agreement with this plan, and wishes to proceed with liquid nitrogen cryotherapy as documented above.    Destr of lesion - Right Upper Back  Complexity: simple    Destruction method: cryotherapy    Timeout:  patient name, date of birth, surgical site, and procedure verified  Lesion destroyed using liquid nitrogen: Yes    Region frozen until ice ball extended beyond lesion: Yes    Cryotherapy cycles:  3  Outcome: patient tolerated procedure well with no complications      2. Rash and other nonspecific skin eruption  Scattered red to pink scaly papules, plaques, macules and patches. 3-5% BSA    This is a 79 y.o. male here for rash follow up. Patient reports itch has decreased from 10/10 to 5/10 at this time. Decrease in BSA from 10-15% BSA  to 3-5% BSA. Much improved after 4 additional weeks. He has not used TCS for 2 weeks. Given continued improvement, will hold on biopsy today as I am suspected drug eruption 2/2 to amlodipine (stopped about 5 weeks ago) vs id reaction 2/2 to amlodipine induced LE edema. Patient happy with improvement, will start back TAC BID x2 weeks then daily x1 week then every other day x1 week then stop. Will reassess at follow up in 6 weeks. He will stop in 4 weeks using TCS in case rash is still present. He verbalized understanding and agreement.     Related Procedures  Follow Up In Dermatology - Established Patient    Related Medications  triamcinolone (Kenalog) 0.1 % ointment  Apply to affected areas twice daily for 2 weeks then daily for 1 week then every other day for 1 week then stop.        Return in 6 months for routine skin check or return to clinic sooner if needed

## 2024-10-02 ENCOUNTER — APPOINTMENT (OUTPATIENT)
Dept: DERMATOLOGY | Facility: CLINIC | Age: 79
End: 2024-10-02
Payer: MEDICARE

## 2024-10-02 VITALS — HEART RATE: 75 BPM | DIASTOLIC BLOOD PRESSURE: 89 MMHG | SYSTOLIC BLOOD PRESSURE: 157 MMHG

## 2024-10-02 DIAGNOSIS — C44.729 SQUAMOUS CELL CARCINOMA OF SKIN OF LEFT LOWER LIMB, INCLUDING HIP: ICD-10-CM

## 2024-10-02 PROCEDURE — 99214 OFFICE O/P EST MOD 30 MIN: CPT | Performed by: DERMATOLOGY

## 2024-10-02 PROCEDURE — 17313 MOHS 1 STAGE T/A/L: CPT | Performed by: DERMATOLOGY

## 2024-10-02 NOTE — PROGRESS NOTES
Office Visit Note  Date: 10/2/2024  Surgeon:  Genny Joya MD  Office Location:  [address] [facility]  [address]  [address]  [facility] [address]  Dept: [phone]  Dept Fax: [phone]  Referring Provider: Lopez Lauren, APRN-CNP  [address]  [address]  [place] [address]    Lora Agustin is a 79 y.o. male who presents for the following: MOHS Surgery (Left Lower Leg- Anterior- SCC)    According to the patient, the lesion has been present for approximately 1 month at the time of diagnosis.  The lesion is not causing symptoms.  The lesion has not been treated previously.    The patient does not have a pacemaker / defibrillator.  The patient does not have a heart valve / joint replacement.    The patient is not on blood thinners.  The patient does not have a history of hepatitis B or C.  The patient does not have a history of HIV.  The patient does not have a history of immunosuppression (e.g. organ transplantation, malignancy, medications)    Review of Systems:  No other skin or systemic complaints other than what is documented elsewhere in the note.    MEDICAL HISTORY: clinically relevant history including significant past medical history, medications and allergies was reviewed and documented in Epic.    Objective   Well appearing patient in no apparent distress; mood and affect are within normal limits.  Vital signs: See record.  Noted on the Left Lower Leg - Anterior  Is a 1.7 x 1.3 cm scar    The patient confirmed the identified site.    Discussion:  The nature of the diagnosis was explained. The lesion is a skin cancer.  It has a risk of local growth and distant spread. The condition is associated with sun exposure.  Warning signs of non-melanoma skin cancer discussed. Patient was instructed to perform monthly self skin examination.  We recommended that the patient have regular full skin exams given an increased risk of subsequent skin cancers. The patient  was instructed to use sun protective behaviors including use of broad spectrum sunscreens and sun protective clothing to reduce risk of skin cancers.      Risks, benefits, side effects of Mohs surgery were discussed with patient and the patient voiced understanding.  It was explained that even though the cure rate of Mohs is very high it is not 100%. Risks of surgery including but not limited to bleeding, infection, numbness, nerve damage, and scar were reviewed.  Discussion included wound care requirements, activity restrictions, likely scar outcome and time to heal.     After Mohs surgery, the defect may need to be repaired surgically and the scar may be longer than the original lesion.  Reconstruction options, risks, and benefits were reviewed including second intention healing, linear repair (4-1 ratio was explained), local flaps, skin grafts, cartilage grafts and interpolation flaps (the need for multiple surgeries was explained). Possible outcomes were reviewed including likely scar appearance, failure of flap survival, infection, bleeding and the need for revision surgery.     The pathology was reviewed, the photograph was reviewed, and the referring physician's note was reviewed.    Patient elected for Mohs surgery.

## 2024-10-02 NOTE — PROGRESS NOTES
Mohs Surgery Operative Note    Date of Surgery:  10/2/2024  Surgeon:  Genny Joya MD  Office Location:  [address] [facility]  [address]  [address]  [facility] [address]  Dept: [phone]  Dept Fax: [phone]  Referring Provider: Lopez Lauren, APRN-CNP  [facility] [address]  [place] [address]      Assessment/Plan   Pre-procedure:   Obtained informed consent: written from patient  The surgical site was identified and confirmed with the patient.     Intra-operative:   Audible time out called at : 10:10 AM 10/02/24  by: Sindi Trejo RN  Verified patient name, birthdate, site, specimen bottle label & requisition.    The planned procedure(s) was again reviewed with the patient. The risks of bleeding, infection, nerve damage and scarring were reviewed. Written authorization was obtained. The patient identity, surgical site, and planned procedure(s) were verified. The provider acted as both surgeon and pathologist.     Squamous cell carcinoma of skin of left lower limb, including hip  Left Lower Leg - Anterior  Mohs surgery  Consent obtained: written    Universal Protocol:  Procedure explained and questions answered to patient or proxy's satisfaction: Yes    Test results available and properly labeled: Yes    Pathology report reviewed: Yes    External notes reviewed: Yes    Photo or diagram used for site identification: Yes    Site/side marked: Yes    Slide independently reviewed by Mohs surgeon: Yes    Immediately prior to procedure a time out was called: Yes    Patient identity confirmed: verbally with patient  Preparation: Patient was prepped and draped in usual sterile fashion      Anticoagulation:  Is the patient taking prescription anticoagulant and/or aspirin prescribed/recommended by a physician? No    Was the anticoagulation regimen changed prior to Mohs? No      Anesthesia:  Anesthesia method: local infiltration  Local anesthetic: lidocaine 2% WITH  epi    Procedure Details:  Case ID Number: [id number]  Biopsy accession number: [id number]  Date of biopsy: 8/28/2024  Pre-Op diagnosis: squamous cell carcinoma  SCC subtype: invasive.  Surgical site (from skin exam): Left Lower Leg - Anterior  Pre-operative length (cm): 1.7  Pre-operative width (cm): 1.3  Indications for Mohs surgery: anatomic location where tissue conservation is critical  Previously treated? No      Micrographic Surgery Details:  Post-operative length (cm): 1.7  Post-operative width (cm): 1.5  Number of Mohs stages: 1    Stage 1  Comments: The patient was brought into the operating room and placed in the procedure chair in the appropriate position.  The area positive by previous biopsy was identified and confirmed with the patient. The area of clinically obvious tumor was debulked using a curette and/or scalpel as needed. An incision was made following the Mohs approach through the skin. The specimen was taken to the lab, divided into 1 piece(s) and appropriately chromacoded and processed.  Tumor features identified on Mohs section: no tumor identified  Depth of defect: subcutaneous fat    Patient tolerance of procedure: tolerated well, no immediate complications    Reconstruction:  Was the defect reconstructed?: No   Repair: After a discussion with the patient regarding the options for wound closure, a decision was made to proceed with second intention healing.  Dressing/Follow-up: Surgifoam was placed in the wound. A pressure dressing was placed to help stabilize the wound and to minimize the risk of postoperative bleeding. Wound care was discussed, and the patient was given written post-operative wound care instructions.  Hemostasis achieved with: Gelfoam and electrodesiccation  Outcome: patient tolerated procedure well with no complications    Post-procedure details: sterile dressing applied and wound care instructions given    Dressing type: Ace wrap, pressure dressing, Gelfoam, petrolatum,  Hypafix and Telfa pad              Wound care was discussed, and the patient was given written post-operative wound care instructions.      The patient will follow up with Genny Joya MD as needed for any post operative problems or concerns, and will follow up with their primary dermatologist as scheduled.

## 2024-10-07 ENCOUNTER — APPOINTMENT (OUTPATIENT)
Dept: OPHTHALMOLOGY | Facility: CLINIC | Age: 79
End: 2024-10-07
Payer: MEDICARE

## 2024-10-07 DIAGNOSIS — H25.813 COMBINED FORMS OF AGE-RELATED CATARACT, BILATERAL: Primary | ICD-10-CM

## 2024-10-07 PROCEDURE — 92015 DETERMINE REFRACTIVE STATE: CPT | Performed by: OPHTHALMOLOGY

## 2024-10-07 PROCEDURE — 92014 COMPRE OPH EXAM EST PT 1/>: CPT | Performed by: OPHTHALMOLOGY

## 2024-10-07 ASSESSMENT — CONF VISUAL FIELD
OS_INFERIOR_NASAL_RESTRICTION: 0
OS_INFERIOR_TEMPORAL_RESTRICTION: 0
OD_INFERIOR_NASAL_RESTRICTION: 0
OD_NORMAL: 1
OD_SUPERIOR_NASAL_RESTRICTION: 0
OS_NORMAL: 1
OD_INFERIOR_TEMPORAL_RESTRICTION: 0
OD_SUPERIOR_TEMPORAL_RESTRICTION: 0
OS_SUPERIOR_NASAL_RESTRICTION: 0
OS_SUPERIOR_TEMPORAL_RESTRICTION: 0

## 2024-10-07 ASSESSMENT — REFRACTION_MANIFEST
OD_SPHERE: -1.25
OD_AXIS: 045
OS_SPHERE: -0.50
OS_CYLINDER: -0.25
OD_CYLINDER: -0.50
OS_AXIS: 045
OS_ADD: +2.50
OD_ADD: +2.50

## 2024-10-07 ASSESSMENT — ENCOUNTER SYMPTOMS
CONSTITUTIONAL NEGATIVE: 0
HEMATOLOGIC/LYMPHATIC NEGATIVE: 0
CARDIOVASCULAR NEGATIVE: 0
MUSCULOSKELETAL NEGATIVE: 0
PSYCHIATRIC NEGATIVE: 0
RESPIRATORY NEGATIVE: 0
ENDOCRINE NEGATIVE: 0
GASTROINTESTINAL NEGATIVE: 0
NEUROLOGICAL NEGATIVE: 0
EYES NEGATIVE: 1
ALLERGIC/IMMUNOLOGIC NEGATIVE: 0

## 2024-10-07 ASSESSMENT — TONOMETRY
OS_IOP_MMHG: 16
IOP_METHOD: TONOPEN
OD_IOP_MMHG: 14

## 2024-10-07 ASSESSMENT — REFRACTION_WEARINGRX
OD_AXIS: 045
OD_CYLINDER: -0.50
OD_SPHERE: -0.25
OS_SPHERE: PLANO
OD_ADD: +2.50
OS_ADD: +2.50
OS_CYLINDER: -0.25
OS_AXIS: 045

## 2024-10-07 ASSESSMENT — VISUAL ACUITY
METHOD: SNELLEN - LINEAR
OD_PH_SC: 20/25
OD_SC: 20/60
OS_SC: 20/30
OS_SC+: -2

## 2024-10-07 ASSESSMENT — CUP TO DISC RATIO
OD_RATIO: 0.3
OS_RATIO: 0.3

## 2024-10-07 ASSESSMENT — EXTERNAL EXAM - LEFT EYE: OS_EXAM: NORMAL

## 2024-10-07 ASSESSMENT — SLIT LAMP EXAM - LIDS
COMMENTS: NORMAL
COMMENTS: NORMAL

## 2024-10-07 ASSESSMENT — EXTERNAL EXAM - RIGHT EYE: OD_EXAM: NORMAL

## 2024-10-07 NOTE — PROGRESS NOTES
Assessment/Plan   Diagnoses and all orders for this visit:  Combined forms of age-related cataract, bilateral  Cataract not visually significant at this time.   Myopic shift  Discussed cataract surgery indications,20/50 or worse, glare dropping vision 2 lines or more and affecting activities of daily living  Observe for progression   Glasses prescription given to patient today   BAT next visit

## 2024-11-04 DIAGNOSIS — K58.2 IRRITABLE BOWEL SYNDROME WITH BOTH CONSTIPATION AND DIARRHEA: ICD-10-CM

## 2024-11-06 ENCOUNTER — APPOINTMENT (OUTPATIENT)
Dept: DERMATOLOGY | Facility: CLINIC | Age: 79
End: 2024-11-06
Payer: MEDICARE

## 2024-11-06 RX ORDER — ESOMEPRAZOLE MAGNESIUM 40 MG/1
40 CAPSULE, DELAYED RELEASE ORAL
Qty: 90 CAPSULE | Refills: 1 | Status: SHIPPED | OUTPATIENT
Start: 2024-11-06 | End: 2025-05-05

## 2024-11-08 ENCOUNTER — APPOINTMENT (OUTPATIENT)
Dept: DERMATOLOGY | Facility: CLINIC | Age: 79
End: 2024-11-08
Payer: MEDICARE

## 2024-11-08 DIAGNOSIS — R21 RASH AND OTHER NONSPECIFIC SKIN ERUPTION: ICD-10-CM

## 2024-11-08 DIAGNOSIS — L82.0 INFLAMED SEBORRHEIC KERATOSIS: ICD-10-CM

## 2024-11-08 DIAGNOSIS — L57.0 ACTINIC KERATOSIS: Primary | ICD-10-CM

## 2024-11-08 PROCEDURE — 17003 DESTRUCT PREMALG LES 2-14: CPT | Performed by: NURSE PRACTITIONER

## 2024-11-08 PROCEDURE — 99213 OFFICE O/P EST LOW 20 MIN: CPT | Performed by: NURSE PRACTITIONER

## 2024-11-08 PROCEDURE — 1036F TOBACCO NON-USER: CPT | Performed by: NURSE PRACTITIONER

## 2024-11-08 PROCEDURE — 17000 DESTRUCT PREMALG LESION: CPT | Performed by: NURSE PRACTITIONER

## 2024-11-08 PROCEDURE — 1159F MED LIST DOCD IN RCRD: CPT | Performed by: NURSE PRACTITIONER

## 2024-11-08 PROCEDURE — 17110 DESTRUCTION B9 LES UP TO 14: CPT | Performed by: NURSE PRACTITIONER

## 2024-11-08 PROCEDURE — 1160F RVW MEDS BY RX/DR IN RCRD: CPT | Performed by: NURSE PRACTITIONER

## 2024-11-08 NOTE — PROGRESS NOTES
Subjective     Arie Agustin is a 79 y.o. male who presents for the following: Rash. \      Lesions on the chest, face and left hand are itchy and irritated.     Review of Systems:  No other skin or systemic complaints other than what is documented elsewhere in the note.    The following portions of the chart were reviewed this encounter and updated as appropriate:   Tobacco  Allergies  Meds  Problems  Med Hx  Surg Hx         Skin Cancer History  Biopsy Date Type Location Status   8/28/24 SCC Left Lower Leg - Anterior Refer Mohs/Surgeon  10/2/24       Specialty Problems    None       Objective   Well appearing patient in no apparent distress; mood and affect are within normal limits.    A focused skin examination was performed waist up and legs. All findings within normal limits unless otherwise noted below.    Assessment/Plan   1. Actinic keratosis (5)  Left Dorsal Hand (5)  Thin erythematous papules with gritty scale    WHAT IS ACTINIC KERATOSIS?   - Actinic keratosis (AK) is a skin condition caused by sun damage. It causes scaly, rough, or bumpy spots on the skin.  - If left alone, AKs may turn into a skin cancer. People who burn easily or have trouble tanning are at more risk for developing AKs.   - There is no one test for AKs and diagnosis is made by clinical appearance. Treatment options include cryotherapy, therapy with lights, and various creams (e.g., topical 5-fluorocuracil, imiquimod).       To lower the chance of getting AK, you can:       ?  Stay out of the sun in the middle of the day (from 10 a.m. to 4 p.m.)       ?  Wear sunscreen - An SPF of at least 30 is best. The SPF number is on the sunscreen bottle or tube.       ?  Wear a wide-brimmed hat, long-sleeved shirt, long pants, or long skirt outside. A baseball hat does not give much protection.        ?  Do not use tanning beds.        ?  Keep a low-fat diet, less than 21% of calories should come from fat       ?  Take Vitamin B3 (nicotinomide)  500mg twice daily.      YOUR TREATMENT PLAN  - At this time I recommend treatment with cryotherapy.  - Possible side effects of liquid nitrogen treatment reviewed including formation of blisters, crusting, tenderness, scar, and discoloration which may be permanent.  - Patient advised to return the office for re-evaluation if the treated lesion(s) do not resolve within 4-6 weeks. Patient verbalizes understanding.    Destr of lesion - Left Dorsal Hand (5)  Complexity: simple    Destruction method: cryotherapy    Informed consent: discussed and consent obtained    Timeout:  patient name, date of birth, surgical site, and procedure verified  Lesion destroyed using liquid nitrogen: Yes    Cryotherapy cycles:  2  Outcome: patient tolerated procedure well with no complications    Post-procedure details: wound care instructions given      2. Rash and other nonspecific skin eruption  Scattered red to pink scaly papules, plaques, macules and patches. 3-5% BSA    This is a 79 y.o. male here for rash follow up. Rash continues to be stable. Itch 3-5/10.  Favor dermatitis at this time. Initially was likely amlodipine induced eczematous drug eruption vs id reaction. That has resolved but patient continues to have areas that come and go. He is applying TAC 1-2 times daily. Advised to avoid applying more than every other day. Advised his use of hot tub for 30 minutes every other day at age 80 is likely drying his skin out too much and is perpetuating his dermatitis. He does not want to curb his hot tub use. Advised to apply moisturizing cream (has gold bond) immediately after hot tub and also daily. Also to use gold bond anti itch cream as needed. Patient verbalized understanding and agreement.     Related Procedures  Follow Up In Dermatology - Established Patient    Related Medications  triamcinolone (Kenalog) 0.1 % ointment  Apply to affected areas twice daily for 2 weeks then daily for 1 week then every other day for 1 week then  stop.    3. Inflamed seborrheic keratosis (6)  Chest - Medial (Center), Head - Anterior (Face), Left Breast (2), Left Dorsal Hand (2)  Erythematous excoriated crusty verrucal papule(s) and/or plaque(s)    Inflamed Seborrheic Keratosis  - Benign nature of lesion(s) discussed with patient, and reassurance provided.  - Given the lesion (s) are very itchy or irritated, the patient is seeking removal and treatment with liquid nitrogen cryotherapy in clinic today was recommended.  - The patient expressed understanding, is in agreement with this plan, and wishes to proceed with liquid nitrogen cryotherapy as documented above.    Destr of lesion - Chest - Medial (Center), Head - Anterior (Face), Left Breast (2), Left Dorsal Hand (2)  Complexity: simple    Destruction method: cryotherapy    Timeout:  patient name, date of birth, surgical site, and procedure verified  Lesion destroyed using liquid nitrogen: Yes    Region frozen until ice ball extended beyond lesion: Yes    Cryotherapy cycles:  3  Outcome: patient tolerated procedure well with no complications          Return in 6 months for routine skin check or return to clinic sooner if needed

## 2024-11-08 NOTE — PATIENT INSTRUCTIONS

## 2024-11-27 DIAGNOSIS — I10 PRIMARY HYPERTENSION: ICD-10-CM

## 2024-11-27 RX ORDER — OLMESARTAN MEDOXOMIL AND HYDROCHLOROTHIAZIDE 40/12.5 40; 12.5 MG/1; MG/1
1 TABLET ORAL DAILY
Qty: 90 TABLET | Refills: 0 | Status: SHIPPED | OUTPATIENT
Start: 2024-11-27 | End: 2025-02-25

## 2024-12-03 ENCOUNTER — APPOINTMENT (OUTPATIENT)
Dept: PRIMARY CARE | Facility: CLINIC | Age: 79
End: 2024-12-03
Payer: MEDICARE

## 2024-12-06 ENCOUNTER — APPOINTMENT (OUTPATIENT)
Dept: PRIMARY CARE | Facility: CLINIC | Age: 79
End: 2024-12-06
Payer: MEDICARE

## 2024-12-06 VITALS
BODY MASS INDEX: 26.65 KG/M2 | HEART RATE: 86 BPM | DIASTOLIC BLOOD PRESSURE: 90 MMHG | TEMPERATURE: 96.3 F | WEIGHT: 196.5 LBS | OXYGEN SATURATION: 96 % | SYSTOLIC BLOOD PRESSURE: 122 MMHG

## 2024-12-06 DIAGNOSIS — I10 PRIMARY HYPERTENSION: Primary | ICD-10-CM

## 2024-12-06 PROCEDURE — G2211 COMPLEX E/M VISIT ADD ON: HCPCS

## 2024-12-06 PROCEDURE — 1159F MED LIST DOCD IN RCRD: CPT

## 2024-12-06 PROCEDURE — 3080F DIAST BP >= 90 MM HG: CPT

## 2024-12-06 PROCEDURE — 3074F SYST BP LT 130 MM HG: CPT

## 2024-12-06 PROCEDURE — 99214 OFFICE O/P EST MOD 30 MIN: CPT

## 2024-12-06 RX ORDER — OLMESARTAN MEDOXOMIL 40 MG/1
40 TABLET ORAL DAILY
Qty: 30 TABLET | Refills: 0 | Status: SHIPPED | OUTPATIENT
Start: 2024-12-06 | End: 2025-01-05

## 2024-12-06 NOTE — PATIENT INSTRUCTIONS
Stop current medication  I sent new medicine to yourpharmacy  Check BP in 1 month  Check home bp daily    Thank you for coming in today, if any questions or concerns arise, please call my office.   DIANE Baldwin-CNP

## 2024-12-06 NOTE — PROGRESS NOTES
Subjective   Patient ID: Arie Agustin is a 79 y.o. male who presents for Hypertension and Follow-up (3 month).  Hypertension  Patient is here for follow-up of elevated blood pressure. He is exercising and is adherent to a low-salt diet. Blood pressure is well controlled at home. Cardiac symptoms: dizziness. Patient denies chest pressure/discomfort, claudication, dyspnea, exertional chest pressure/discomfort, fatigue, irregular heart beat, lower extremity edema, near-syncope, orthopnea, palpitations, paroxysmal nocturnal dyspnea, and syncope. Cardiovascular risk factors: advanced age (older than 55 for men, 65 for women), hypertension, male gender, and sedentary lifestyle. Use of agents associated with hypertension: none. History of target organ damage: none.          Vitals:    12/06/24 1353   BP: 122/90   Pulse: 86   Temp: 35.7 °C (96.3 °F)   SpO2: 96%       Review of Systems    Objective   Physical Exam    Assessment/Plan   Problem List Items Addressed This Visit       Hypertension - Primary    Relevant Medications    olmesartan (BENIcar) 40 mg tablet            Thank you for coming in today, please call my office if you have any concerns or questions.     Marcelino CONTRERAS, CNP

## 2024-12-13 ENCOUNTER — APPOINTMENT (OUTPATIENT)
Dept: DERMATOLOGY | Facility: CLINIC | Age: 79
End: 2024-12-13
Payer: MEDICARE

## 2024-12-13 ENCOUNTER — OFFICE VISIT (OUTPATIENT)
Dept: DERMATOLOGY | Facility: CLINIC | Age: 79
End: 2024-12-13
Payer: MEDICARE

## 2024-12-13 DIAGNOSIS — L57.9 SKIN CHANGES DUE TO CHRONIC EXPOSURE TO NONIONIZING RADIATION: ICD-10-CM

## 2024-12-13 DIAGNOSIS — Z85.828 HISTORY OF NONMELANOMA SKIN CANCER: ICD-10-CM

## 2024-12-13 DIAGNOSIS — L90.5 SCAR CONDITIONS AND FIBROSIS OF SKIN: ICD-10-CM

## 2024-12-13 DIAGNOSIS — R21 RASH AND OTHER NONSPECIFIC SKIN ERUPTION: ICD-10-CM

## 2024-12-13 DIAGNOSIS — L57.0 ACTINIC KERATOSIS: ICD-10-CM

## 2024-12-13 DIAGNOSIS — L82.0 INFLAMED SEBORRHEIC KERATOSIS: Primary | ICD-10-CM

## 2024-12-13 DIAGNOSIS — L82.1 SEBORRHEIC KERATOSIS: ICD-10-CM

## 2024-12-13 DIAGNOSIS — L81.4 LENTIGO: ICD-10-CM

## 2024-12-13 DIAGNOSIS — D22.9 BENIGN NEVUS: ICD-10-CM

## 2024-12-13 DIAGNOSIS — D18.01 ANGIOMA OF SKIN: ICD-10-CM

## 2024-12-13 PROCEDURE — 17110 DESTRUCTION B9 LES UP TO 14: CPT | Performed by: NURSE PRACTITIONER

## 2024-12-13 PROCEDURE — 1159F MED LIST DOCD IN RCRD: CPT | Performed by: NURSE PRACTITIONER

## 2024-12-13 PROCEDURE — 99213 OFFICE O/P EST LOW 20 MIN: CPT | Performed by: NURSE PRACTITIONER

## 2024-12-13 PROCEDURE — 1036F TOBACCO NON-USER: CPT | Performed by: NURSE PRACTITIONER

## 2024-12-13 PROCEDURE — 17003 DESTRUCT PREMALG LES 2-14: CPT | Performed by: NURSE PRACTITIONER

## 2024-12-13 PROCEDURE — 17000 DESTRUCT PREMALG LESION: CPT | Performed by: NURSE PRACTITIONER

## 2024-12-13 NOTE — PROGRESS NOTES
Subjective     Arie Agustin is a 79 y.o. male who presents for the following: Skin Check.     Review of Systems:  No other skin or systemic complaints other than what is documented elsewhere in the note.    The following portions of the chart were reviewed this encounter and updated as appropriate:          Skin Cancer History  Biopsy Date Type Location Status   8/28/24 SCC Left Lower Leg - Anterior Refer Mohs/Surgeon  10/2/24       Specialty Problems    None       Objective   Well appearing patient in no apparent distress; mood and affect are within normal limits.    A focused skin examination was performed waist up and legs. All findings within normal limits unless otherwise noted below.    Assessment/Plan   1. Inflamed seborrheic keratosis (2)  Left Breast, Right Posterior Neck  Erythematous excoriated crusty verrucal papule(s) and/or plaque(s)    Inflamed Seborrheic Keratosis  - Benign nature of lesion(s) discussed with patient, and reassurance provided.  - Given the lesion (s) are very itchy or irritated, the patient is seeking removal and treatment with liquid nitrogen cryotherapy in clinic today was recommended.  - The patient expressed understanding, is in agreement with this plan, and wishes to proceed with liquid nitrogen cryotherapy as documented above.    Destr of lesion - Left Breast, Right Posterior Neck  Complexity: simple    Destruction method: cryotherapy    Timeout:  patient name, date of birth, surgical site, and procedure verified  Lesion destroyed using liquid nitrogen: Yes    Region frozen until ice ball extended beyond lesion: Yes    Cryotherapy cycles:  3  Outcome: patient tolerated procedure well with no complications      Related Procedures  Follow Up In Dermatology - Established Patient    2. Actinic keratosis (3)  Right Forehead, Right Malar Cheek, Right Temple  Thin erythematous papules with gritty scale    WHAT IS ACTINIC KERATOSIS?   - Actinic keratosis (AK) is a skin condition caused  by sun damage. It causes scaly, rough, or bumpy spots on the skin.  - If left alone, AKs may turn into a skin cancer. People who burn easily or have trouble tanning are at more risk for developing AKs.   - There is no one test for AKs and diagnosis is made by clinical appearance. Treatment options include cryotherapy, therapy with lights, and various creams (e.g., topical 5-fluorocuracil, imiquimod).       To lower the chance of getting AK, you can:       ?  Stay out of the sun in the middle of the day (from 10 a.m. to 4 p.m.)       ?  Wear sunscreen - An SPF of at least 30 is best. The SPF number is on the sunscreen bottle or tube.       ?  Wear a wide-brimmed hat, long-sleeved shirt, long pants, or long skirt outside. A baseball hat does not give much protection.        ?  Do not use tanning beds.        ?  Keep a low-fat diet, less than 21% of calories should come from fat       ?  Take Vitamin B3 (nicotinomide) 500mg twice daily.      YOUR TREATMENT PLAN  - At this time I recommend treatment with cryotherapy.  - Possible side effects of liquid nitrogen treatment reviewed including formation of blisters, crusting, tenderness, scar, and discoloration which may be permanent.  - Patient advised to return the office for re-evaluation if the treated lesion(s) do not resolve within 4-6 weeks. Patient verbalizes understanding.    Destr of lesion - Right Forehead, Right Malar Cheek, Right Scientologist  Complexity: simple    Destruction method: cryotherapy    Informed consent: discussed and consent obtained    Timeout:  patient name, date of birth, surgical site, and procedure verified  Lesion destroyed using liquid nitrogen: Yes    Cryotherapy cycles:  2  Outcome: patient tolerated procedure well with no complications    Post-procedure details: wound care instructions given      Related Procedures  Follow Up In Dermatology - Established Patient    3. Angioma of skin  Scattered cherry-red papule(s).    A cherry hemangioma is a  small macule (small, flat, smooth area) or papule (small, solid bump) formed from an overgrowth of tiny blood vessels in the skin. Cherry hemangiomas are characteristically red or purplish in color. They often first appear in middle adulthood and usually increase in number with age. Cherry hemangiomas are noncancerous (benign) and are common in adults.    The present appearance of the lesion is not worrisome but it should continue to be observed and testing/treatment may be warranted if change occurs.    Related Procedures  Follow Up In Dermatology - Established Patient    4. Benign nevus  Scattered, uniform and benign-appearing, regular brown melanocytic papules and macules.    The present appearance of the lesion is not worrisome but it should continue to be observed and testing/treatment may be warranted if change occurs.    Related Procedures  Follow Up In Dermatology - Established Patient    5. Seborrheic keratosis  Stuck on verrucous, tan-brown papules and plaques.      Seborrheic keratoses are common noncancerous (benign) growths of unknown cause seen in adults due to a thickening of an area of the top skin layer. Seborrheic keratoses may appear as if they are stuck on to the skin. They have distinct borders, and they may appear as papules (small, solid bumps) or plaques (solid, raised patches that are bigger than a thumbnail). They may be the same color as your skin, or they may be pink, light brown, darker brown, or very dark brown, or sometimes may appear black.    There is no way to prevent new seborrheic keratoses from forming. Seborrheic keratoses can be removed, but removal is considered a cosmetic issue and is usually not covered by insurance.    PLAN  No treatment is needed unless there is irritation from clothing, such as itching or bleeding.  2.   Some lotions containing alpha hydroxy acids, salicylic acid, or urea may make the areas feel smoother with regular use but will not eliminate  them.    Related Procedures  Follow Up In Dermatology - Established Patient    6. Lentigo  Scattered tan macules in sun-exposed areas.    A solar lentigo (plural, solar lentigines), also known as a sun-induced freckle or senile lentigo, is a dark (hyperpigmented) lesion caused by natural or artificial ultraviolet (UV) light. Solar lentigines may be single or multiple. This type of lentigo is different from a simple lentigo (lentigo simplex) because it is caused by exposure to UV light. Solar lentigines are benign, but they do indicate excessive sun exposure, a risk factor for the development of skin cancer.    To prevent solar lentigines, avoid exposure to sunlight in midday (10 AM to 3 PM), wear sun-protective clothing (tightly woven clothes and hats), and apply sunscreen (SPF 30 UVA and UVB block).    The present appearance of the lesion is not worrisome but it should continue to be observed and testing/treatment may be warranted if change occurs.    Related Procedures  Follow Up In Dermatology - Established Patient    7. Scar conditions and fibrosis of skin  Left Lower Leg - Anterior  Well healed scar at the site(s) of prior treatment with no evidence of recurrence.          The scar is clear, there is no evidence of recurrence.  The present appearance of the scar is not worrisome but it should continue to be observed and testing/treatment may be warranted if change occurs.      Related Procedures  Follow Up In Dermatology - Established Patient    8. History of nonmelanoma skin cancer    ABCDEs of melanoma and atypical moles were discussed with the patient.    Patient was instructed to perform monthly self skin examination.  We recommended that the patient have regular full skin exams given an increased risk of subsequent skin cancers.    The patient was instructed to use sun protective behaviors including use of broad spectrum sunscreens and sun protective clothing to reduce risk of skin cancers.    Warning  signs of non-melanoma skin cancer discussed.    Related Procedures  Follow Up In Dermatology - Established Patient    9. Skin changes due to chronic exposure to nonionizing radiation  Actinic changes in the form of freckles, lentigines and hyper/hypopigmentation     ABCDEs of melanoma and atypical moles were discussed with the patient.    Patient was instructed to perform monthly self skin examination.  We recommended that the patient have regular full skin exams given an increased risk of subsequent skin cancers.    The patient was instructed to use sun protective behaviors including use of broad spectrum sunscreens and sun protective clothing to reduce risk of skin cancers.    Warning signs of non-melanoma skin cancer discussed.    Related Procedures  Follow Up In Dermatology - Established Patient    10. Rash and other nonspecific skin eruption  Scattered red to pink scaly papules, plaques, macules and patches. 1-2% BSA    This is a 79 y.o. male here for rash follow up. Rash continues to be stable. Itch 3-5/10.  Favor dermatitis at this time. Minimal skin involvement at this time. Will continue with TAC BID PRN. Reviewed impact of hot tub on drying skin out. Recommended using a humidifier in his environment and keeping it at around 40%. Also encouraged more consistent moisturizing cream or ointment application. Continue with TAC BID PRN. Avoid using more than 14 days out of the month.     The following information regarding the use of topical steroids was provided: Local skin thinning,striae, and telangiectasia can occur with chronic application of this medication.  Long term use oftopical steroids should be avoided      Related Procedures  Follow Up In Dermatology - Established Patient    Related Medications  triamcinolone (Kenalog) 0.1 % ointment  Apply to affected areas twice daily for 2 weeks then daily for 1 week then every other day for 1 week then stop.        Return in 6 months for routine skin check or  return to clinic sooner if needed

## 2024-12-13 NOTE — PATIENT INSTRUCTIONS

## 2025-01-06 ENCOUNTER — APPOINTMENT (OUTPATIENT)
Dept: PRIMARY CARE | Facility: CLINIC | Age: 80
End: 2025-01-06
Payer: MEDICARE

## 2025-01-06 VITALS
OXYGEN SATURATION: 99 % | TEMPERATURE: 97.8 F | WEIGHT: 191.9 LBS | SYSTOLIC BLOOD PRESSURE: 138 MMHG | BODY MASS INDEX: 26.03 KG/M2 | HEART RATE: 76 BPM | DIASTOLIC BLOOD PRESSURE: 82 MMHG

## 2025-01-06 DIAGNOSIS — I10 PRIMARY HYPERTENSION: ICD-10-CM

## 2025-01-06 PROCEDURE — 99214 OFFICE O/P EST MOD 30 MIN: CPT

## 2025-01-06 PROCEDURE — 1036F TOBACCO NON-USER: CPT

## 2025-01-06 PROCEDURE — 1160F RVW MEDS BY RX/DR IN RCRD: CPT

## 2025-01-06 PROCEDURE — G2211 COMPLEX E/M VISIT ADD ON: HCPCS

## 2025-01-06 PROCEDURE — 1159F MED LIST DOCD IN RCRD: CPT

## 2025-01-06 PROCEDURE — 3075F SYST BP GE 130 - 139MM HG: CPT

## 2025-01-06 PROCEDURE — 3079F DIAST BP 80-89 MM HG: CPT

## 2025-01-06 RX ORDER — OLMESARTAN MEDOXOMIL 40 MG/1
40 TABLET ORAL DAILY
Qty: 90 TABLET | Refills: 0 | Status: SHIPPED | OUTPATIENT
Start: 2025-01-06 | End: 2025-04-06

## 2025-01-06 NOTE — PATIENT INSTRUCTIONS
Continue current therapy  See me in 3 months BP Check    Thank you for coming in today, if any questions or concerns arise, please call my office.   Marcelino Daniel, DIANE-CNP

## 2025-01-06 NOTE — PROGRESS NOTES
Subjective   Patient ID: Arie Agustin is a 79 y.o. male who presents for Follow-up (Bp check).  Hypertension  Patient is here for follow-up of elevated blood pressure. He is exercising and is adherent to a low-salt diet. Blood pressure is well controlled at home. Cardiac symptoms: none. Patient denies chest pain, chest pressure/discomfort, claudication, dyspnea, exertional chest pressure/discomfort, fatigue, irregular heart beat, lower extremity edema, near-syncope, orthopnea, palpitations, paroxysmal nocturnal dyspnea, syncope, and tachypnea. Cardiovascular risk factors: advanced age (older than 55 for men, 65 for women), dyslipidemia, family history of premature cardiovascular disease, and sedentary lifestyle. Use of agents associated with hypertension: none. History of target organ damage: none.        Vitals:    01/06/25 1331   BP: 138/82   Pulse: 76   Temp: 36.6 °C (97.8 °F)   SpO2: 99%       Review of Systems    Objective   Physical Exam    Assessment/Plan   Problem List Items Addressed This Visit       Hypertension    Relevant Medications    olmesartan (BENIcar) 40 mg tablet            Thank you for coming in today, please call my office if you have any concerns or questions.     Marcelino CONTRERAS, CNP

## 2025-02-10 ENCOUNTER — APPOINTMENT (OUTPATIENT)
Dept: DERMATOLOGY | Facility: CLINIC | Age: 80
End: 2025-02-10
Payer: MEDICARE

## 2025-03-05 ENCOUNTER — APPOINTMENT (OUTPATIENT)
Dept: DERMATOLOGY | Facility: CLINIC | Age: 80
End: 2025-03-05
Payer: MEDICARE

## 2025-03-14 ENCOUNTER — APPOINTMENT (OUTPATIENT)
Dept: PRIMARY CARE | Facility: CLINIC | Age: 80
End: 2025-03-14
Payer: MEDICARE

## 2025-04-01 DIAGNOSIS — I10 PRIMARY HYPERTENSION: ICD-10-CM

## 2025-04-01 RX ORDER — OLMESARTAN MEDOXOMIL 40 MG/1
40 TABLET ORAL DAILY
Qty: 90 TABLET | Refills: 0 | Status: SHIPPED | OUTPATIENT
Start: 2025-04-01 | End: 2025-06-30

## 2025-04-07 ENCOUNTER — APPOINTMENT (OUTPATIENT)
Dept: PRIMARY CARE | Facility: CLINIC | Age: 80
End: 2025-04-07
Payer: MEDICARE

## 2025-04-07 VITALS
HEART RATE: 64 BPM | SYSTOLIC BLOOD PRESSURE: 142 MMHG | DIASTOLIC BLOOD PRESSURE: 70 MMHG | BODY MASS INDEX: 25.9 KG/M2 | WEIGHT: 191 LBS | TEMPERATURE: 97.4 F | OXYGEN SATURATION: 96 %

## 2025-04-07 DIAGNOSIS — Z13.1 DIABETES MELLITUS SCREENING: ICD-10-CM

## 2025-04-07 DIAGNOSIS — Z00.00 HEALTHCARE MAINTENANCE: ICD-10-CM

## 2025-04-07 DIAGNOSIS — Z13.220 SCREENING FOR HYPERLIPIDEMIA: ICD-10-CM

## 2025-04-07 DIAGNOSIS — E55.9 VITAMIN D DEFICIENCY: Primary | ICD-10-CM

## 2025-04-07 DIAGNOSIS — R79.9 ABNORMAL FINDING OF BLOOD CHEMISTRY, UNSPECIFIED: ICD-10-CM

## 2025-04-07 DIAGNOSIS — Z12.5 PROSTATE CANCER SCREENING: ICD-10-CM

## 2025-04-07 DIAGNOSIS — Z13.89 SCREENING FOR MULTIPLE CONDITIONS: ICD-10-CM

## 2025-04-07 DIAGNOSIS — Z13.0 SCREENING FOR DEFICIENCY ANEMIA: ICD-10-CM

## 2025-04-07 DIAGNOSIS — Z13.29 THYROID DISORDER SCREENING: ICD-10-CM

## 2025-04-07 PROCEDURE — 3077F SYST BP >= 140 MM HG: CPT

## 2025-04-07 PROCEDURE — 1160F RVW MEDS BY RX/DR IN RCRD: CPT

## 2025-04-07 PROCEDURE — 3078F DIAST BP <80 MM HG: CPT

## 2025-04-07 PROCEDURE — 1159F MED LIST DOCD IN RCRD: CPT

## 2025-04-07 PROCEDURE — 99214 OFFICE O/P EST MOD 30 MIN: CPT

## 2025-04-07 PROCEDURE — 1036F TOBACCO NON-USER: CPT

## 2025-04-07 ASSESSMENT — PATIENT HEALTH QUESTIONNAIRE - PHQ9
SUM OF ALL RESPONSES TO PHQ9 QUESTIONS 1 AND 2: 0
1. LITTLE INTEREST OR PLEASURE IN DOING THINGS: NOT AT ALL
2. FEELING DOWN, DEPRESSED OR HOPELESS: NOT AT ALL

## 2025-04-07 ASSESSMENT — ENCOUNTER SYMPTOMS
ABDOMINAL PAIN: 0
FEVER: 0
WEAKNESS: 0
UNEXPECTED WEIGHT CHANGE: 0
SHORTNESS OF BREATH: 0
ACTIVITY CHANGE: 0
ALLERGIC/IMMUNOLOGIC NEGATIVE: 1
GASTROINTESTINAL NEGATIVE: 1
CARDIOVASCULAR NEGATIVE: 1
PSYCHIATRIC NEGATIVE: 1
DIZZINESS: 0
HEADACHES: 0
MUSCULOSKELETAL NEGATIVE: 1
FATIGUE: 0
ENDOCRINE NEGATIVE: 1
RESPIRATORY NEGATIVE: 1
HYPERTENSION: 1

## 2025-04-07 NOTE — PATIENT INSTRUCTIONS
Please set up [facility] for the summer  Labs prior to this appointment    You are due for Shingles, RSV, and Tetanus vaccines  Get these done at the pharmacy and the health dept.    Thank you for coming in today, if any questions or concerns arise, please call my office.   DIANE Baldwin-CNP

## 2025-04-07 NOTE — PROGRESS NOTES
Subjective   Patient ID: Arie Agustin is a 80 y.o. male who presents for Follow-up (3 month) and Hypertension.  Hypertension  Patient is here for follow-up of elevated blood pressure. He is exercising and is adherent to a low-salt diet. Blood pressure is well controlled at home. Cardiac symptoms: none. Patient denies chest pain, chest pressure/discomfort, claudication, dyspnea, exertional chest pressure/discomfort, fatigue, irregular heart beat, lower extremity edema, near-syncope, orthopnea, palpitations, paroxysmal nocturnal dyspnea, syncope, and tachypnea. Cardiovascular risk factors: advanced age (older than 55 for men, 65 for women). Use of agents associated with hypertension: none. History of target organ damage: none.      Hypertension  Pertinent negatives include no chest pain, headaches or shortness of breath.       Vitals:    04/07/25 1252   BP: 142/70   Pulse: 64   Temp: 36.3 °C (97.4 °F)   SpO2: 96%       Review of Systems   Constitutional:  Negative for activity change, fatigue, fever and unexpected weight change.   HENT: Negative.     Respiratory: Negative.  Negative for shortness of breath.    Cardiovascular: Negative.  Negative for chest pain.   Gastrointestinal: Negative.  Negative for abdominal pain.   Endocrine: Negative.    Musculoskeletal: Negative.    Skin: Negative.    Allergic/Immunologic: Negative.    Neurological:  Negative for dizziness, weakness and headaches.   Psychiatric/Behavioral: Negative.         Objective   Physical Exam  Vitals and nursing note reviewed.   Constitutional:       Appearance: Normal appearance.   HENT:      Head: Normocephalic.      Mouth/Throat:      Mouth: Mucous membranes are moist.   Cardiovascular:      Rate and Rhythm: Normal rate and regular rhythm.      Pulses: Normal pulses.      Heart sounds: Normal heart sounds. No murmur heard.     No friction rub. No gallop.   Pulmonary:      Effort: Pulmonary effort is normal. No respiratory distress.      Breath  sounds: Normal breath sounds. No wheezing.   Abdominal:      General: Bowel sounds are normal. There is no distension.      Palpations: Abdomen is soft.      Tenderness: There is no abdominal tenderness.   Musculoskeletal:         General: No deformity. Normal range of motion.   Skin:     General: Skin is warm and dry.      Capillary Refill: Capillary refill takes less than 2 seconds.   Neurological:      General: No focal deficit present.      Mental Status: He is alert and oriented to person, place, and time.   Psychiatric:         Mood and Affect: Mood normal.         Assessment/Plan   Problem List Items Addressed This Visit       Vitamin D deficiency - Primary    Relevant Orders    Vitamin D 25-Hydroxy,Total (for eval of Vitamin D levels)     Other Visit Diagnoses       Screening for multiple conditions        Relevant Orders    CBC and Auto Differential    Healthcare maintenance        Relevant Orders    Comprehensive Metabolic Panel    Screening for hyperlipidemia        Relevant Orders    Lipid Panel    Thyroid disorder screening        Relevant Orders    TSH with reflex to Free T4 if abnormal    Screening for deficiency anemia        Relevant Orders    Vitamin B12    Diabetes mellitus screening        Relevant Orders    Hemoglobin A1C    Prostate cancer screening        Relevant Orders    PSA, Total and Free    Abnormal finding of blood chemistry, unspecified        Relevant Orders    CBC and Auto Differential    Hemoglobin A1C                 Thank you for coming in today, please call my office if you have any concerns or questions.     Marcelino CONTRERAS, CNP

## 2025-04-15 LAB
25(OH)D3+25(OH)D2 SERPL-MCNC: 33 NG/ML (ref 30–100)
ALBUMIN SERPL-MCNC: 4.3 G/DL (ref 3.6–5.1)
ALP SERPL-CCNC: 61 U/L (ref 35–144)
ALT SERPL-CCNC: 23 U/L (ref 9–46)
ANION GAP SERPL CALCULATED.4IONS-SCNC: 9 MMOL/L (CALC) (ref 7–17)
AST SERPL-CCNC: 20 U/L (ref 10–35)
BASOPHILS # BLD AUTO: 52 CELLS/UL (ref 0–200)
BASOPHILS NFR BLD AUTO: 0.8 %
BILIRUB SERPL-MCNC: 0.7 MG/DL (ref 0.2–1.2)
BUN SERPL-MCNC: 16 MG/DL (ref 7–25)
CALCIUM SERPL-MCNC: 9.2 MG/DL (ref 8.6–10.3)
CHLORIDE SERPL-SCNC: 110 MMOL/L (ref 98–110)
CHOLEST SERPL-MCNC: 165 MG/DL
CHOLEST/HDLC SERPL: 4.1 (CALC)
CO2 SERPL-SCNC: 25 MMOL/L (ref 20–32)
CREAT SERPL-MCNC: 1.08 MG/DL (ref 0.7–1.22)
EGFRCR SERPLBLD CKD-EPI 2021: 69 ML/MIN/1.73M2
EOSINOPHIL # BLD AUTO: 280 CELLS/UL (ref 15–500)
EOSINOPHIL NFR BLD AUTO: 4.3 %
ERYTHROCYTE [DISTWIDTH] IN BLOOD BY AUTOMATED COUNT: 13.1 % (ref 11–15)
EST. AVERAGE GLUCOSE BLD GHB EST-MCNC: 148 MG/DL
EST. AVERAGE GLUCOSE BLD GHB EST-SCNC: 8.2 MMOL/L
GLUCOSE SERPL-MCNC: 130 MG/DL (ref 65–99)
HBA1C MFR BLD: 6.8 %
HCT VFR BLD AUTO: 46 % (ref 38.5–50)
HDLC SERPL-MCNC: 40 MG/DL
HGB BLD-MCNC: 15.8 G/DL (ref 13.2–17.1)
LDLC SERPL CALC-MCNC: 104 MG/DL (CALC)
LYMPHOCYTES # BLD AUTO: 1359 CELLS/UL (ref 850–3900)
LYMPHOCYTES NFR BLD AUTO: 20.9 %
MCH RBC QN AUTO: 34.2 PG (ref 27–33)
MCHC RBC AUTO-ENTMCNC: 34.3 G/DL (ref 32–36)
MCV RBC AUTO: 99.6 FL (ref 80–100)
MONOCYTES # BLD AUTO: 520 CELLS/UL (ref 200–950)
MONOCYTES NFR BLD AUTO: 8 %
NEUTROPHILS # BLD AUTO: 4290 CELLS/UL (ref 1500–7800)
NEUTROPHILS NFR BLD AUTO: 66 %
NONHDLC SERPL-MCNC: 125 MG/DL (CALC)
PLATELET # BLD AUTO: 211 THOUSAND/UL (ref 140–400)
PMV BLD REES-ECKER: 10.4 FL (ref 7.5–12.5)
POTASSIUM SERPL-SCNC: 4.5 MMOL/L (ref 3.5–5.3)
PROT SERPL-MCNC: 6.8 G/DL (ref 6.1–8.1)
PSA FREE MFR SERPL: 30 % (CALC)
PSA FREE SERPL-MCNC: 0.3 NG/ML
PSA SERPL-MCNC: 1 NG/ML
RBC # BLD AUTO: 4.62 MILLION/UL (ref 4.2–5.8)
SODIUM SERPL-SCNC: 144 MMOL/L (ref 135–146)
TRIGL SERPL-MCNC: 109 MG/DL
TSH SERPL-ACNC: 2.28 MIU/L (ref 0.4–4.5)
VIT B12 SERPL-MCNC: 349 PG/ML (ref 200–1100)
WBC # BLD AUTO: 6.5 THOUSAND/UL (ref 3.8–10.8)

## 2025-06-06 ENCOUNTER — APPOINTMENT (OUTPATIENT)
Dept: DERMATOLOGY | Facility: CLINIC | Age: 80
End: 2025-06-06
Payer: MEDICARE

## 2025-06-21 DIAGNOSIS — I10 PRIMARY HYPERTENSION: ICD-10-CM

## 2025-06-23 RX ORDER — OLMESARTAN MEDOXOMIL 40 MG/1
40 TABLET ORAL DAILY
Qty: 90 TABLET | Refills: 0 | Status: SHIPPED | OUTPATIENT
Start: 2025-06-23 | End: 2025-09-21

## 2025-07-08 ENCOUNTER — APPOINTMENT (OUTPATIENT)
Dept: PRIMARY CARE | Facility: CLINIC | Age: 80
End: 2025-07-08
Payer: MEDICARE

## 2025-07-08 VITALS
HEIGHT: 72 IN | WEIGHT: 178.3 LBS | OXYGEN SATURATION: 98 % | DIASTOLIC BLOOD PRESSURE: 80 MMHG | BODY MASS INDEX: 24.15 KG/M2 | SYSTOLIC BLOOD PRESSURE: 140 MMHG | TEMPERATURE: 96.4 F | HEART RATE: 75 BPM

## 2025-07-08 DIAGNOSIS — Z13.89 SCREENING FOR MULTIPLE CONDITIONS: ICD-10-CM

## 2025-07-08 DIAGNOSIS — Z71.89 CARDIAC RISK COUNSELING: ICD-10-CM

## 2025-07-08 DIAGNOSIS — Z13.220 SCREENING FOR HYPERLIPIDEMIA: ICD-10-CM

## 2025-07-08 DIAGNOSIS — Z72.89 OTHER PROBLEMS RELATED TO LIFESTYLE: ICD-10-CM

## 2025-07-08 DIAGNOSIS — Z13.6 SCREENING FOR CARDIOVASCULAR CONDITION: ICD-10-CM

## 2025-07-08 DIAGNOSIS — Z00.00 ROUTINE GENERAL MEDICAL EXAMINATION AT HEALTH CARE FACILITY: Primary | ICD-10-CM

## 2025-07-08 DIAGNOSIS — Z00.00 ROUTINE GENERAL MEDICAL EXAMINATION AT A HEALTH CARE FACILITY: ICD-10-CM

## 2025-07-08 DIAGNOSIS — Z13.1 DIABETES MELLITUS SCREENING: ICD-10-CM

## 2025-07-08 DIAGNOSIS — Z13.1 SCREENING FOR DIABETES MELLITUS: ICD-10-CM

## 2025-07-08 ASSESSMENT — ENCOUNTER SYMPTOMS
CARDIOVASCULAR NEGATIVE: 1
LOSS OF SENSATION IN FEET: 0
DIZZINESS: 0
RESPIRATORY NEGATIVE: 1
OCCASIONAL FEELINGS OF UNSTEADINESS: 0
HEADACHES: 0
DEPRESSION: 0
ALLERGIC/IMMUNOLOGIC NEGATIVE: 1
FATIGUE: 0
FEVER: 0
SHORTNESS OF BREATH: 0
GASTROINTESTINAL NEGATIVE: 1
PSYCHIATRIC NEGATIVE: 1
ABDOMINAL PAIN: 0
ENDOCRINE NEGATIVE: 1
UNEXPECTED WEIGHT CHANGE: 0
MUSCULOSKELETAL NEGATIVE: 1
ACTIVITY CHANGE: 0
WEAKNESS: 0

## 2025-07-08 ASSESSMENT — ACTIVITIES OF DAILY LIVING (ADL)
TAKING_MEDICATION: INDEPENDENT
GROCERY_SHOPPING: INDEPENDENT
BATHING: INDEPENDENT
DOING_HOUSEWORK: INDEPENDENT
MANAGING_FINANCES: INDEPENDENT
DRESSING: INDEPENDENT

## 2025-07-08 ASSESSMENT — VISUAL ACUITY
OD_CC: 20/30
OS_CC: 20/30

## 2025-07-08 NOTE — PATIENT INSTRUCTIONS
RSV Vaccine due.    Blood work looks good  See me in 6 months for A1c recheck, you are in pre-diabetic range    Need DNR documentation.    Thank you for coming in today, if any questions or concerns arise, please call my office.   DIANE Baldwin-CNP

## 2025-07-08 NOTE — PROGRESS NOTES
Subjective   Reason for Visit: Arie Agustin is an 80 y.o. male here for a Medicare Wellness visit.     Past Medical, Surgical, and Family History reviewed and updated in chart.    Reviewed all medications by prescribing practitioner or clinical pharmacist (such as prescriptions, OTCs, herbal therapies and supplements) and documented in the medical record.    Medicare annual wellness exam  BP in good shape  Feels well most days    Intermittent diarrhea at times.        Patient Care Team:  CHON Baldwin as PCP - General (Family Medicine)  CHON Baldwin as PCP - [employer] PCP     Review of Systems   Constitutional:  Negative for activity change, fatigue, fever and unexpected weight change.   HENT: Negative.     Respiratory: Negative.  Negative for shortness of breath.    Cardiovascular: Negative.  Negative for chest pain.   Gastrointestinal: Negative.  Negative for abdominal pain.   Endocrine: Negative.    Musculoskeletal: Negative.    Skin: Negative.    Allergic/Immunologic: Negative.    Neurological:  Negative for dizziness, weakness and headaches.   Psychiatric/Behavioral: Negative.         Objective   Vitals:  /80   Pulse 75   Temp 35.8 °C (96.4 °F)   Ht 1.829 m (6')   Wt 80.9 kg (178 lb 4.8 oz)   SpO2 98%   BMI 24.18 kg/m²       Physical Exam  Vitals and nursing note reviewed.   Constitutional:       Appearance: Normal appearance.   HENT:      Head: Normocephalic.      Mouth/Throat:      Mouth: Mucous membranes are moist.   Cardiovascular:      Rate and Rhythm: Normal rate and regular rhythm.      Pulses: Normal pulses.      Heart sounds: Normal heart sounds. No murmur heard.     No friction rub. No gallop.   Pulmonary:      Effort: Pulmonary effort is normal. No respiratory distress.      Breath sounds: Normal breath sounds. No wheezing.   Abdominal:      General: Bowel sounds are normal. There is no distension.      Palpations: Abdomen is soft.       Tenderness: There is no abdominal tenderness.   Musculoskeletal:         General: No deformity. Normal range of motion.   Skin:     General: Skin is warm and dry.      Capillary Refill: Capillary refill takes less than 2 seconds.   Neurological:      General: No focal deficit present.      Mental Status: He is alert and oriented to person, place, and time.   Psychiatric:         Mood and Affect: Mood normal.         Assessment & Plan  Routine general medical examination at health care facility    Orders:    1 Year Follow Up In Primary Care - Wellness Exam; Future    Screening for multiple conditions         Diabetes mellitus screening    Orders:    Urine Microalbumin - Lab collect    Screening for cardiovascular condition         Cardiac risk counseling         Other problems related to lifestyle         Routine general medical examination at a health care facility         Screening for diabetes mellitus         Screening for hyperlipidemia            The ASCVD Risk score (Ish ANNA, et al., 2019) failed to calculate for the following reasons:    The 2019 ASCVD risk score is only valid for ages 40 to 79      Advance Directives Discussion  16 - 20 minutes were spent discussing Advanced Care Planning (including a Living Will, Medical Power Of , as well as specific end of life choices and/or directives). The details of that discussion were documented in Advanced Directives Discussion section of the medical record.     Living will, POA  Has DNR, needs documentation    Alcohol Misuse Screen  5 - 10 minutes were spent screening the patient for alcohol misuse disorder.    Depression Screening  5 - 10 minutes were spent screening for depression.    Patient Health Questionnaire-2 Score: 0 (7/8/2025  3:35 PM)     Obesity Counseling  15 - 20 minutes were spent counseling on diet and exercise interventions to address obesity and weight reduction.

## 2025-07-10 LAB
ALBUMIN/CREAT UR: 6 MG/G CREAT
CREAT UR-MCNC: 210 MG/DL (ref 20–320)
MICROALBUMIN UR-MCNC: 1.2 MG/DL

## 2025-09-05 DIAGNOSIS — K58.2 IRRITABLE BOWEL SYNDROME WITH BOTH CONSTIPATION AND DIARRHEA: ICD-10-CM

## 2025-09-05 DIAGNOSIS — I10 PRIMARY HYPERTENSION: ICD-10-CM

## 2025-09-05 RX ORDER — OLMESARTAN MEDOXOMIL 40 MG/1
40 TABLET ORAL DAILY
Qty: 90 TABLET | Refills: 1 | Status: SHIPPED | OUTPATIENT
Start: 2025-09-05

## 2025-09-05 RX ORDER — ESOMEPRAZOLE MAGNESIUM 40 MG/1
40 CAPSULE, DELAYED RELEASE ORAL
Qty: 90 CAPSULE | Refills: 1 | Status: SHIPPED | OUTPATIENT
Start: 2025-09-05 | End: 2026-03-04

## 2025-10-14 ENCOUNTER — APPOINTMENT (OUTPATIENT)
Dept: OPHTHALMOLOGY | Facility: CLINIC | Age: 80
End: 2025-10-14
Payer: MEDICARE

## 2025-11-03 ENCOUNTER — APPOINTMENT (OUTPATIENT)
Dept: OPHTHALMOLOGY | Facility: CLINIC | Age: 80
End: 2025-11-03
Payer: MEDICARE

## 2026-01-08 ENCOUNTER — APPOINTMENT (OUTPATIENT)
Dept: PRIMARY CARE | Facility: CLINIC | Age: 81
End: 2026-01-08
Payer: MEDICARE

## 2026-07-09 ENCOUNTER — APPOINTMENT (OUTPATIENT)
Dept: PRIMARY CARE | Facility: CLINIC | Age: 81
End: 2026-07-09
Payer: MEDICARE